# Patient Record
Sex: MALE | Race: WHITE | NOT HISPANIC OR LATINO | Employment: FULL TIME | URBAN - NONMETROPOLITAN AREA
[De-identification: names, ages, dates, MRNs, and addresses within clinical notes are randomized per-mention and may not be internally consistent; named-entity substitution may affect disease eponyms.]

---

## 2023-06-12 DIAGNOSIS — I10 HYPERTENSION, UNSPECIFIED TYPE: ICD-10-CM

## 2023-06-12 RX ORDER — HYDROCHLOROTHIAZIDE 12.5 MG/1
12.5 CAPSULE ORAL DAILY
Qty: 90 CAPSULE | Refills: 1 | Status: SHIPPED | OUTPATIENT
Start: 2023-06-12 | End: 2023-12-07

## 2023-06-12 RX ORDER — HYDROCHLOROTHIAZIDE 12.5 MG/1
1 CAPSULE ORAL DAILY
COMMUNITY
Start: 2021-07-12 | End: 2023-06-12 | Stop reason: SDUPTHER

## 2023-06-16 LAB
ALANINE AMINOTRANSFERASE (SGPT) (U/L) IN SER/PLAS: 27 U/L (ref 10–52)
ALBUMIN (G/DL) IN SER/PLAS: 5 G/DL (ref 3.4–5)
ALKALINE PHOSPHATASE (U/L) IN SER/PLAS: 67 U/L (ref 33–120)
ANION GAP IN SER/PLAS: 15 MMOL/L (ref 10–20)
ASPARTATE AMINOTRANSFERASE (SGOT) (U/L) IN SER/PLAS: 22 U/L (ref 9–39)
BILIRUBIN TOTAL (MG/DL) IN SER/PLAS: 1 MG/DL (ref 0–1.2)
CALCIUM (MG/DL) IN SER/PLAS: 10.3 MG/DL (ref 8.6–10.6)
CARBON DIOXIDE, TOTAL (MMOL/L) IN SER/PLAS: 25 MMOL/L (ref 21–32)
CHLORIDE (MMOL/L) IN SER/PLAS: 107 MMOL/L (ref 98–107)
CHOLESTEROL (MG/DL) IN SER/PLAS: 184 MG/DL (ref 0–199)
CHOLESTEROL IN HDL (MG/DL) IN SER/PLAS: 43.1 MG/DL
CHOLESTEROL/HDL RATIO: 4.3
CREATININE (MG/DL) IN SER/PLAS: 0.89 MG/DL (ref 0.5–1.3)
ERYTHROCYTE DISTRIBUTION WIDTH (RATIO) BY AUTOMATED COUNT: 13.2 % (ref 11.5–14.5)
ERYTHROCYTE MEAN CORPUSCULAR HEMOGLOBIN CONCENTRATION (G/DL) BY AUTOMATED: 32.4 G/DL (ref 32–36)
ERYTHROCYTE MEAN CORPUSCULAR VOLUME (FL) BY AUTOMATED COUNT: 89 FL (ref 80–100)
ERYTHROCYTES (10*6/UL) IN BLOOD BY AUTOMATED COUNT: 5.21 X10E12/L (ref 4.5–5.9)
GFR MALE: >90 ML/MIN/1.73M2
GLUCOSE (MG/DL) IN SER/PLAS: 96 MG/DL (ref 74–99)
HEMATOCRIT (%) IN BLOOD BY AUTOMATED COUNT: 46.3 % (ref 41–52)
HEMOGLOBIN (G/DL) IN BLOOD: 15 G/DL (ref 13.5–17.5)
LDL: 98 MG/DL (ref 0–99)
LEUKOCYTES (10*3/UL) IN BLOOD BY AUTOMATED COUNT: 6.6 X10E9/L (ref 4.4–11.3)
NON HDL CHOLESTEROL: 141 MG/DL
NRBC (PER 100 WBCS) BY AUTOMATED COUNT: 0 /100 WBC (ref 0–0)
PLATELETS (10*3/UL) IN BLOOD AUTOMATED COUNT: 226 X10E9/L (ref 150–450)
POTASSIUM (MMOL/L) IN SER/PLAS: 4.5 MMOL/L (ref 3.5–5.3)
PROSTATE SPECIFIC ANTIGEN,SCREEN: 0.36 NG/ML (ref 0–4)
PROTEIN TOTAL: 7.1 G/DL (ref 6.4–8.2)
SODIUM (MMOL/L) IN SER/PLAS: 142 MMOL/L (ref 136–145)
TRIGLYCERIDE (MG/DL) IN SER/PLAS: 214 MG/DL (ref 0–149)
UREA NITROGEN (MG/DL) IN SER/PLAS: 15 MG/DL (ref 6–23)
VLDL: 43 MG/DL (ref 0–40)

## 2023-06-21 DIAGNOSIS — E78.49 OTHER HYPERLIPIDEMIA: Primary | ICD-10-CM

## 2023-06-21 PROBLEM — E78.5 HYPERLIPIDEMIA: Status: ACTIVE | Noted: 2023-06-21

## 2023-06-21 RX ORDER — ROSUVASTATIN CALCIUM 10 MG/1
1 TABLET, COATED ORAL DAILY
COMMUNITY
Start: 2014-04-10 | End: 2023-06-21 | Stop reason: SDUPTHER

## 2023-06-21 RX ORDER — ROSUVASTATIN CALCIUM 10 MG/1
TABLET, COATED ORAL
Qty: 90 TABLET | Refills: 3 | Status: SHIPPED | OUTPATIENT
Start: 2023-06-21

## 2023-07-06 LAB — FECAL OCCULT BLD IMMUNOASSAY: NEGATIVE

## 2023-07-14 DIAGNOSIS — I10 HYPERTENSION, UNSPECIFIED TYPE: ICD-10-CM

## 2023-07-14 RX ORDER — LISINOPRIL 10 MG/1
1 TABLET ORAL DAILY
COMMUNITY
Start: 2022-05-09 | End: 2023-07-14 | Stop reason: SDUPTHER

## 2023-07-14 RX ORDER — LISINOPRIL 10 MG/1
TABLET ORAL
Qty: 90 TABLET | Refills: 3 | Status: SHIPPED | OUTPATIENT
Start: 2023-07-14 | End: 2023-07-17 | Stop reason: SINTOL

## 2023-07-17 ENCOUNTER — OFFICE VISIT (OUTPATIENT)
Dept: PRIMARY CARE | Facility: CLINIC | Age: 58
End: 2023-07-17
Payer: COMMERCIAL

## 2023-07-17 VITALS
OXYGEN SATURATION: 97 % | HEART RATE: 46 BPM | TEMPERATURE: 97 F | SYSTOLIC BLOOD PRESSURE: 136 MMHG | RESPIRATION RATE: 14 BRPM | BODY MASS INDEX: 25.34 KG/M2 | WEIGHT: 171.1 LBS | HEIGHT: 69 IN | DIASTOLIC BLOOD PRESSURE: 73 MMHG

## 2023-07-17 DIAGNOSIS — J30.9 ALLERGIC RHINITIS, UNSPECIFIED SEASONALITY, UNSPECIFIED TRIGGER: ICD-10-CM

## 2023-07-17 DIAGNOSIS — Z13.6 SCREENING FOR CARDIOVASCULAR CONDITION: ICD-10-CM

## 2023-07-17 DIAGNOSIS — E78.5 HYPERLIPIDEMIA, UNSPECIFIED HYPERLIPIDEMIA TYPE: ICD-10-CM

## 2023-07-17 DIAGNOSIS — E03.9 HYPOTHYROIDISM, UNSPECIFIED TYPE: ICD-10-CM

## 2023-07-17 DIAGNOSIS — E66.3 OVERWEIGHT WITH BODY MASS INDEX (BMI) OF 25 TO 25.9 IN ADULT: ICD-10-CM

## 2023-07-17 DIAGNOSIS — Z00.00 HEALTHCARE MAINTENANCE: Primary | ICD-10-CM

## 2023-07-17 DIAGNOSIS — J45.909 REACTIVE AIRWAY DISEASE WITHOUT COMPLICATION, UNSPECIFIED ASTHMA SEVERITY, UNSPECIFIED WHETHER PERSISTENT (HHS-HCC): ICD-10-CM

## 2023-07-17 DIAGNOSIS — R01.1 HEART MURMUR: ICD-10-CM

## 2023-07-17 DIAGNOSIS — I10 HYPERTENSION, UNSPECIFIED TYPE: ICD-10-CM

## 2023-07-17 PROBLEM — M25.561 RIGHT KNEE PAIN: Status: ACTIVE | Noted: 2023-07-17

## 2023-07-17 PROBLEM — N20.0 RECURRENT KIDNEY STONES: Status: ACTIVE | Noted: 2023-07-17

## 2023-07-17 PROBLEM — K21.9 GERD (GASTROESOPHAGEAL REFLUX DISEASE): Status: ACTIVE | Noted: 2023-07-17

## 2023-07-17 PROBLEM — H35.719 CENTRAL SEROUS RETINOPATHY: Status: ACTIVE | Noted: 2023-07-17

## 2023-07-17 PROBLEM — R00.1 BRADYCARDIA: Status: ACTIVE | Noted: 2023-07-17

## 2023-07-17 PROBLEM — G47.30 SLEEP APNEA: Status: ACTIVE | Noted: 2023-07-17

## 2023-07-17 PROBLEM — E23.0 HYPOPITUITARISM (MULTI): Status: ACTIVE | Noted: 2023-07-17

## 2023-07-17 PROCEDURE — 99396 PREV VISIT EST AGE 40-64: CPT | Performed by: FAMILY MEDICINE

## 2023-07-17 PROCEDURE — 3075F SYST BP GE 130 - 139MM HG: CPT | Performed by: FAMILY MEDICINE

## 2023-07-17 PROCEDURE — 3008F BODY MASS INDEX DOCD: CPT | Performed by: FAMILY MEDICINE

## 2023-07-17 PROCEDURE — 99214 OFFICE O/P EST MOD 30 MIN: CPT | Performed by: FAMILY MEDICINE

## 2023-07-17 PROCEDURE — 3077F SYST BP >= 140 MM HG: CPT | Performed by: FAMILY MEDICINE

## 2023-07-17 PROCEDURE — 1036F TOBACCO NON-USER: CPT | Performed by: FAMILY MEDICINE

## 2023-07-17 PROCEDURE — 3078F DIAST BP <80 MM HG: CPT | Performed by: FAMILY MEDICINE

## 2023-07-17 RX ORDER — MONTELUKAST SODIUM 10 MG/1
10 TABLET ORAL NIGHTLY
Qty: 30 TABLET | Refills: 11 | Status: SHIPPED | OUTPATIENT
Start: 2023-07-17 | End: 2023-08-08

## 2023-07-17 RX ORDER — LEVOTHYROXINE SODIUM 75 UG/1
1 TABLET ORAL DAILY
COMMUNITY
Start: 2013-02-08 | End: 2023-07-19

## 2023-07-17 RX ORDER — MULTIVITAMIN
1 TABLET ORAL DAILY
COMMUNITY

## 2023-07-17 RX ORDER — OLMESARTAN MEDOXOMIL 20 MG/1
10 TABLET ORAL DAILY
Qty: 15 TABLET | Refills: 1 | Status: SHIPPED | OUTPATIENT
Start: 2023-07-17 | End: 2023-08-08

## 2023-07-17 RX ORDER — ALBUTEROL SULFATE 90 UG/1
2 AEROSOL, METERED RESPIRATORY (INHALATION) EVERY 4 HOURS PRN
Qty: 8.5 G | Refills: 0 | Status: SHIPPED | OUTPATIENT
Start: 2023-07-17 | End: 2024-01-22 | Stop reason: SDUPTHER

## 2023-07-17 NOTE — ASSESSMENT & PLAN NOTE
Noted on prior exam, not present today, patient reassured.  Will continue to reassess on physical exams.

## 2023-07-17 NOTE — PROGRESS NOTES
Subjective   Patient ID: Damion Williamson is a 57 y.o. male who presents for Annual Exam (Pt is here for annual physical exam- ABN given to pt and signed, pt verbalized understanding.).    Well Adult Physical   Patient here for a comprehensive physical exam.      TDAP: 1/2021  SHINGRIX: completed  PNEUMOVAX: N/A  CSCOPE: 6/2023 FIT negative  PSA: 0.36 in 6/2023  AAA SCREEN: N/A (never smoker)  HEP C SCREEN: 1/2021 negative  CACS: 4.4 on 7/2016;   LIPID: 6/2023 TC/HDL ratio 4.3 (LDL 98 & TRIG 214).  Lipoprotein profile 9/2017 overall favorable with LDL particle size    States doing well overall.  Notes history of allergies, states worse this year than every.  Never smoker but states has lots of land and is clearing bushes often.  Some coughing on the Lisinopril.  Notes drainage which is abundant and new for him.  Drainage/congestion notably worse in AM.    Diet/Exercise: Balanced, exercises frequently.    Prostate cancer screening: Denies family history.   Denies hesitancy, nocturia, or urgency.     Colon cancer screening: Denies family history.   Denies melena, hematochezia, constipation, diarrhea, bloating, change in bowel habits.    CHRONIC CONDITIONS:  -Heart murmur  1-2/6 to RT upper sternal border noted on 5/2022 exam.  Patient asymptomatic from cardiac standpoint.  No FMHx of valvular issues.     -HTN  Taking HCTZ 12.5 mg daily, trial of Olmesartan to be started 7/2023  Lisinopril discontinued 7/2023 due to cough  Propranolol discontinued in the past due to bradycardia.  Suspect component of white coat hypertension, however, patient does not have validated home cuff.  7/2022 BP machine was NOT valid x2.     Medications are being taken and tolerated well. No side effects are reported.  Patient is NOT taking blood pressure outside of office.  Patient denies chest pain, shortness of breath with exertion, palpitations, lower extremity edema, headache, or dizziness.      -HLD/Mild CAD  Taking Rosuvastatin 10 mg  "daily.  Unable to tolerate Pravastatin due to side effects.  7/2016 CACS was 4.4.  9/2017 lipoprotein profile overall favorable with LDL particle size  6/2023 TC/HDL ratio 4.3 (LDL 98 & TRIG 214).    Medication(s) are being taken as directed and tolerated well.   No side effects reported.  Patient denies any facial droop, sudden vision loss, weakness or numbness on one side of body.   Patient denies chest pain, shortness of breath with exertion, palpitations, or symptoms of claudication.      -Hypothyroidism  CURRENT DOSE: Synthroid 75 mcg daily.  1/2023 TSH was normal.       Review of Systems   All other systems reviewed and are negative.      Objective   /71 (BP Location: Left arm, Patient Position: Sitting, BP Cuff Size: Small adult)   Pulse (!) 46   Temp 36.1 °C (97 °F) (Temporal)   Resp 14   Ht 1.759 m (5' 9.25\")   Wt 77.6 kg (171 lb 1.6 oz)   SpO2 97%   BMI 25.08 kg/m²     Physical Exam  Vitals and nursing note reviewed.   Constitutional:       General: He is not in acute distress.     Appearance: Normal appearance. He is not toxic-appearing.   HENT:      Head: Normocephalic and atraumatic.      Nose:      Right Turbinates: Swollen and pale.      Left Turbinates: Swollen and pale.      Comments: Clear post-nasal drainage noted  Eyes:      Extraocular Movements: Extraocular movements intact.      Pupils: Pupils are equal, round, and reactive to light.   Neck:      Thyroid: No thyromegaly.   Cardiovascular:      Rate and Rhythm: Regular rhythm.      Heart sounds: No murmur heard.     No friction rub. No gallop.      Comments: Borderline bradycardic  Pulmonary:      Effort: Pulmonary effort is normal.      Breath sounds: Wheezing (expiratory noted posteriorly) present. No rhonchi or rales.   Abdominal:      General: Bowel sounds are normal. There is no distension.      Palpations: Abdomen is soft. There is no mass.      Tenderness: There is no abdominal tenderness. There is no guarding. "   Musculoskeletal:      Right lower leg: No edema.      Left lower leg: No edema.   Lymphadenopathy:      Cervical: No cervical adenopathy.   Skin:     General: Skin is warm and dry.   Neurological:      General: No focal deficit present.      Mental Status: He is alert and oriented to person, place, and time.   Psychiatric:         Mood and Affect: Mood normal.         Behavior: Behavior normal.         Assessment/Plan   Problem List Items Addressed This Visit       Hypertension     BP is 164/71 in office today, this is elevated.  Possible hx of white coat hypertension, will recheck prior to discharge.  Due to report of cough will discontinue the Lisinopril and start on Olmesartan in place.  Trial of Olmesartan 20 mg sent to pharm (patient to start with 10 mg dose, to cut in half as 10 mg not available in EMR).  Continue HCTZ 12.5 mg daily  Patient asked to monitor BP a few times per week and keep log.    Patient to check BP regularly at home and keep a diary - DO THIS 1 HOUR AFTER TAKING MEDS.   This should be taken after sitting with feet flat on floor and resting for 5 minutes.   Arm should be level with your heart.   New guidelines recommend goal for blood pressure less than 130/80.   Ideally for stroke and heart attack risk reduction the systolic, or top, blood pressure number should be in the 110's or 120's.    PLEASE BRING BP CUFF IN TO NEXT VISIT FOR VALIDATION - TAKE YOUR BP @ HOME BEFORE YOU COME!            Relevant Medications    olmesartan (Benicar) 20 mg tablet    Hyperlipidemia     7/2016 CACS was 4.4.  9/2017 lipoprotein profile overall favorable with LDL particle size    6/2023 TC/HDL ratio 4.3 (LDL 98 & TRIG 214).  Goal TC/HDL ratio 3.4 or less, LDL 99 or less,  or less, and TRIG 150 or less.     Continue Rosuvastatin 10 mg daily.  Diet and exercise recommendations revisited.     I have ordered a coronary artery calcium score to better determine how to treat your elevated cholesterol. This  is a CT scan to determine if you have calcified plaque in your coronary arteries. Coronary artery calcium scores are used to help us determine how to best treat your elevated cholesterol.  The risks of this screen is we may find things that we are not looking for that we will have to follow up on such as lung nodules which are very common in [facility] fatty liver which is common in individuals that are overweight. There is also risk of radiation exposure. Follow up visit will be scheduled to review coronary artery calcium score, if needed. Patient to call or message for order if she wishes to pursue.          Heart murmur     Noted on prior exam, not present today, patient reassured.  Will continue to reassess on physical exams.         Hypothyroidism     1/2023 TSH normal, to be checked annually unless symptomatic, continue present regimen.  CURRENT DOSE: Synthroid 75 mcg daily.         Overweight with body mass index (BMI) of 25 to 25.9 in adult    Healthcare maintenance - Primary     Vaccines and screenings reviewed.  Questionnaires completed.  Health and wellness topics reviewed.  Diet and exercise recommendations revisited.  Routine blood work reviewed today.          Allergic rhinitis     + cough, + wheezing, + post-nasal drip  Discontinuing Lisinopril today incase contributing to cough.  CXR deferred as CACS was ordered and will provide a more in depth evaluation.    START trial of montelukast, which is generic Singulair.  You can take this daily or just daily during your season of flare for allergies/asthma.   Side effects of this medication can include vivid dreams and depression.   Should this occur, stop the medication and those side effects resolve.     An albuterol inhaler has been sent to your pharmacy.  You may use one to two puffs every four hours as needed for wheeze, cough, or chest tightness, or for use 15 minutes prior to activity if exercise is triggering your symptoms. Ask your pharmacist  for help with instructions for administration if you are not familiar with use of inhalers.     Continue with good hydration, at least 64 ounces of water per day.  Can start daily Mucinex if still have issues with secretions.          Other Visit Diagnoses       Screening for cardiovascular condition              Follow-up in 6 months for routine care.  Call for sooner follow-up if needed.     Time Spent  Prep time on day of patient encounter: 5 minutes  Time spent directly with patient, family or caregiver: 22 minutes  Additional Time Spent on Patient Care Activities: 5 minutes  Documentation Time: 8 minutes  Other Time Spent: 0 minutes  Total: 40 minutes        Scribe Attestation  By signing my name below, IZulema Scribe   attest that this documentation has been prepared under the direction and in the presence of Karlene Hicks DO.

## 2023-07-17 NOTE — ASSESSMENT & PLAN NOTE
BP is 164/71 in office today, this is elevated.  Possible hx of white coat hypertension, will recheck prior to discharge.  Due to report of cough will discontinue the Lisinopril and start on Olmesartan in place.  Trial of Olmesartan 20 mg sent to pharm (patient to start with 10 mg dose, to cut in half as 10 mg not available in EMR).  Continue HCTZ 12.5 mg daily  Patient asked to monitor BP a few times per week and keep log.    Patient to check BP regularly at home and keep a diary - DO THIS 1 HOUR AFTER TAKING MEDS.   This should be taken after sitting with feet flat on floor and resting for 5 minutes.   Arm should be level with your heart.   New guidelines recommend goal for blood pressure less than 130/80.   Ideally for stroke and heart attack risk reduction the systolic, or top, blood pressure number should be in the 110's or 120's.    PLEASE BRING BP CUFF IN TO NEXT VISIT FOR VALIDATION - TAKE YOUR BP @ HOME BEFORE YOU COME!

## 2023-07-17 NOTE — ASSESSMENT & PLAN NOTE
7/2016 CACS was 4.4.  9/2017 lipoprotein profile overall favorable with LDL particle size    6/2023 TC/HDL ratio 4.3 (LDL 98 & TRIG 214).  Goal TC/HDL ratio 3.4 or less, LDL 99 or less,  or less, and TRIG 150 or less.     Continue Rosuvastatin 10 mg daily.  Diet and exercise recommendations revisited.     I have ordered a coronary artery calcium score to better determine how to treat your elevated cholesterol. This is a CT scan to determine if you have calcified plaque in your coronary arteries. Coronary artery calcium scores are used to help us determine how to best treat your elevated cholesterol.  The risks of this screen is we may find things that we are not looking for that we will have to follow up on such as lung nodules which are very common in [facility] fatty liver which is common in individuals that are overweight. There is also risk of radiation exposure. Follow up visit will be scheduled to review coronary artery calcium score, if needed. Patient to call or message for order if she wishes to pursue.

## 2023-07-17 NOTE — ASSESSMENT & PLAN NOTE
Vaccines and screenings reviewed.  Questionnaires completed.  Health and wellness topics reviewed.  Diet and exercise recommendations revisited.  Routine blood work reviewed today.

## 2023-07-17 NOTE — ASSESSMENT & PLAN NOTE
+ cough, + wheezing, + post-nasal drip  Discontinuing Lisinopril today incase contributing to cough.  CXR deferred as CACS was ordered and will provide a more in depth evaluation.    START trial of montelukast, which is generic Singulair.  You can take this daily or just daily during your season of flare for allergies/asthma.   Side effects of this medication can include vivid dreams and depression.   Should this occur, stop the medication and those side effects resolve.     An albuterol inhaler has been sent to your pharmacy.  You may use one to two puffs every four hours as needed for wheeze, cough, or chest tightness, or for use 15 minutes prior to activity if exercise is triggering your symptoms. Ask your pharmacist for help with instructions for administration if you are not familiar with use of inhalers.     Continue with good hydration, at least 64 ounces of water per day.  Can start daily Mucinex if still have issues with secretions.

## 2023-07-19 DIAGNOSIS — E03.8 OTHER SPECIFIED HYPOTHYROIDISM: ICD-10-CM

## 2023-07-19 RX ORDER — LEVOTHYROXINE SODIUM 75 UG/1
TABLET ORAL
Qty: 90 TABLET | Refills: 3 | Status: SHIPPED | OUTPATIENT
Start: 2023-07-19

## 2023-08-08 DIAGNOSIS — I10 HYPERTENSION, UNSPECIFIED TYPE: ICD-10-CM

## 2023-08-08 DIAGNOSIS — J30.9 ALLERGIC RHINITIS, UNSPECIFIED SEASONALITY, UNSPECIFIED TRIGGER: ICD-10-CM

## 2023-08-08 RX ORDER — MONTELUKAST SODIUM 10 MG/1
10 TABLET ORAL NIGHTLY
Qty: 30 TABLET | Refills: 11 | Status: SHIPPED | OUTPATIENT
Start: 2023-08-08 | End: 2024-08-07

## 2023-08-08 RX ORDER — OLMESARTAN MEDOXOMIL 20 MG/1
10 TABLET ORAL DAILY
Qty: 15 TABLET | Refills: 1 | Status: SHIPPED | OUTPATIENT
Start: 2023-08-08 | End: 2023-09-06

## 2023-09-06 DIAGNOSIS — I10 HYPERTENSION, UNSPECIFIED TYPE: ICD-10-CM

## 2023-09-06 RX ORDER — OLMESARTAN MEDOXOMIL 20 MG/1
10 TABLET ORAL DAILY
Qty: 15 TABLET | Refills: 0 | Status: SHIPPED | OUTPATIENT
Start: 2023-09-06 | End: 2023-10-12 | Stop reason: SDUPTHER

## 2023-10-12 DIAGNOSIS — I10 HYPERTENSION, UNSPECIFIED TYPE: ICD-10-CM

## 2023-10-12 RX ORDER — OLMESARTAN MEDOXOMIL 20 MG/1
10 TABLET ORAL DAILY
Qty: 45 TABLET | Refills: 3 | Status: SHIPPED | OUTPATIENT
Start: 2023-10-12 | End: 2024-10-06

## 2023-12-07 DIAGNOSIS — I10 HYPERTENSION, UNSPECIFIED TYPE: ICD-10-CM

## 2023-12-07 RX ORDER — HYDROCHLOROTHIAZIDE 12.5 MG/1
12.5 CAPSULE ORAL DAILY
Qty: 90 CAPSULE | Refills: 3 | Status: SHIPPED | OUTPATIENT
Start: 2023-12-07

## 2024-01-18 NOTE — PROGRESS NOTES
"Subjective   Patient ID: Damion Williamson \"Sami\" is a 58 y.o. male who presents for Follow-up, Hypertension (Brought in BP cuff to measure accuracy. ), Hyperlipidemia, Hypothyroidism, and Sinus Problem (Issued since chayito time, states he has swollen lymph nodes under arms. Would like to discuss. ).  HPI  Patient of Dr. Garber here for 6 month follow up - last visit 7/2023 with [facility] for a physical    CHRONIC CONDITIONS:    -HTN  Taking HCTZ 12.5 mg daily, trial of Olmesartan to be started 7/2023  Lisinopril discontinued 7/2023 due to cough  Propranolol discontinued in the past due to bradycardia.  Suspect component of white coat hypertension, however, brought in home machine and just got it yesterday and was 135/80, 122/80 - through [employer]  Does not salt food, does love salty foods at restaurant, but overall does try to keep salt in diet reduced     Medications are being taken and tolerated well. No side effects are reported.  Patient has just started taking blood pressure outside of office.  Patient denies chest pain, shortness of breath with exertion, palpitations, lower extremity edema, headache, or dizziness.      -HLD/Mild CAD  Taking Rosuvastatin 10 mg daily.  Unable to tolerate Pravastatin due to side effects.  7/2016 CACS was 4.4.  9/2017 lipoprotein profile overall favorable with LDL particle size  6/2023 TC/HDL ratio 4.3 (LDL 98 & TRIG 214).     Medication(s) are being taken as directed and tolerated well.   No side effects reported.  Patient denies chest pain, shortness of breath with exertion, palpitations, or symptoms of claudication.      -Hypothyroidism  CURRENT DOSE: Synthroid 75 mcg daily.  1/2023 TSH was normal. -ordered today    Objective   Visit Vitals  /80   Pulse 56   Temp 36.6 °C (97.9 °F) (Temporal)   Resp 14   Ht 1.759 m (5' 9.25\")   Wt 81.2 kg (179 lb)   SpO2 98%   BMI 26.24 kg/m²   Smoking Status Never   BSA 1.99 m²      Physical Exam  Constitutional:       General: He is " not in acute distress.  HENT:      Head: Normocephalic and atraumatic.      Right Ear: Tympanic membrane, ear canal and external ear normal.      Left Ear: Tympanic membrane, ear canal and external ear normal.      Nose: Nose normal.      Mouth/Throat:      Mouth: Mucous membranes are moist.      Pharynx: No posterior oropharyngeal erythema.   Eyes:      General: No scleral icterus.     Extraocular Movements: Extraocular movements intact.      Pupils: Pupils are equal, round, and reactive to light.   Cardiovascular:      Rate and Rhythm: Normal rate and regular rhythm.      Pulses: Normal pulses.      Heart sounds: No murmur heard.  Pulmonary:      Effort: Pulmonary effort is normal. No respiratory distress.      Breath sounds: Normal breath sounds. No wheezing.   Abdominal:      General: Bowel sounds are normal. There is no distension.      Palpations: Abdomen is soft.      Tenderness: There is no abdominal tenderness.   Musculoskeletal:         General: Normal range of motion.      Cervical back: Neck supple. No rigidity.      Right lower leg: No edema.      Left lower leg: No edema.   Lymphadenopathy:      Cervical: No cervical adenopathy.   Skin:     General: Skin is warm and dry.      Findings: No rash.   Neurological:      General: No focal deficit present.      Mental Status: He is alert and oriented to person, place, and time.   Psychiatric:         Mood and Affect: Mood normal.         Thought Content: Thought content normal.         Assessment/Plan   Problem List Items Addressed This Visit       Hypertension - Primary    Hypothyroidism    Relevant Orders    Tsh With Reflex To Free T4 If Abnormal     Other Visit Diagnoses       Reactive airway disease without complication, unspecified asthma severity, unspecified whether persistent        Relevant Medications    albuterol (ProAir HFA) 90 mcg/actuation inhaler        No changes in medications at this time. TSH ordered today

## 2024-01-22 ENCOUNTER — APPOINTMENT (OUTPATIENT)
Dept: PRIMARY CARE | Facility: CLINIC | Age: 59
End: 2024-01-22
Payer: COMMERCIAL

## 2024-01-22 ENCOUNTER — OFFICE VISIT (OUTPATIENT)
Dept: PRIMARY CARE | Facility: CLINIC | Age: 59
End: 2024-01-22
Payer: COMMERCIAL

## 2024-01-22 ENCOUNTER — LAB (OUTPATIENT)
Dept: LAB | Facility: LAB | Age: 59
End: 2024-01-22
Payer: COMMERCIAL

## 2024-01-22 VITALS
HEART RATE: 56 BPM | WEIGHT: 179 LBS | TEMPERATURE: 97.9 F | BODY MASS INDEX: 26.51 KG/M2 | SYSTOLIC BLOOD PRESSURE: 122 MMHG | RESPIRATION RATE: 14 BRPM | DIASTOLIC BLOOD PRESSURE: 80 MMHG | HEIGHT: 69 IN | OXYGEN SATURATION: 98 %

## 2024-01-22 DIAGNOSIS — E03.9 HYPOTHYROIDISM, UNSPECIFIED TYPE: ICD-10-CM

## 2024-01-22 DIAGNOSIS — I10 HYPERTENSION, UNSPECIFIED TYPE: Primary | ICD-10-CM

## 2024-01-22 DIAGNOSIS — J45.909 REACTIVE AIRWAY DISEASE WITHOUT COMPLICATION, UNSPECIFIED ASTHMA SEVERITY, UNSPECIFIED WHETHER PERSISTENT (HHS-HCC): ICD-10-CM

## 2024-01-22 PROBLEM — E23.0 HYPOPITUITARISM (MULTI): Status: RESOLVED | Noted: 2023-07-17 | Resolved: 2024-01-22

## 2024-01-22 PROCEDURE — 99214 OFFICE O/P EST MOD 30 MIN: CPT | Performed by: FAMILY MEDICINE

## 2024-01-22 PROCEDURE — 1036F TOBACCO NON-USER: CPT | Performed by: FAMILY MEDICINE

## 2024-01-22 PROCEDURE — 84443 ASSAY THYROID STIM HORMONE: CPT

## 2024-01-22 PROCEDURE — 36415 COLL VENOUS BLD VENIPUNCTURE: CPT

## 2024-01-22 PROCEDURE — 3078F DIAST BP <80 MM HG: CPT | Performed by: FAMILY MEDICINE

## 2024-01-22 PROCEDURE — 3074F SYST BP LT 130 MM HG: CPT | Performed by: FAMILY MEDICINE

## 2024-01-22 PROCEDURE — 3008F BODY MASS INDEX DOCD: CPT | Performed by: FAMILY MEDICINE

## 2024-01-22 RX ORDER — ALBUTEROL SULFATE 90 UG/1
2 AEROSOL, METERED RESPIRATORY (INHALATION) EVERY 4 HOURS PRN
Qty: 8.5 G | Refills: 0 | Status: SHIPPED | OUTPATIENT
Start: 2024-01-22 | End: 2025-01-21

## 2024-01-22 RX ORDER — ASPIRIN 81 MG/1
81 TABLET ORAL DAILY
COMMUNITY

## 2024-01-22 ASSESSMENT — PATIENT HEALTH QUESTIONNAIRE - PHQ9
SUM OF ALL RESPONSES TO PHQ9 QUESTIONS 1 AND 2: 0
2. FEELING DOWN, DEPRESSED OR HOPELESS: NOT AT ALL
1. LITTLE INTEREST OR PLEASURE IN DOING THINGS: NOT AT ALL

## 2024-01-23 LAB — TSH SERPL-ACNC: 2.05 MIU/L (ref 0.44–3.98)

## 2024-06-17 DIAGNOSIS — E78.49 OTHER HYPERLIPIDEMIA: ICD-10-CM

## 2024-06-17 RX ORDER — ROSUVASTATIN CALCIUM 10 MG/1
TABLET, COATED ORAL
Qty: 90 TABLET | Refills: 0 | Status: SHIPPED | OUTPATIENT
Start: 2024-06-17

## 2024-07-15 DIAGNOSIS — E03.8 OTHER SPECIFIED HYPOTHYROIDISM: ICD-10-CM

## 2024-07-15 RX ORDER — LEVOTHYROXINE SODIUM 75 UG/1
75 TABLET ORAL DAILY
Qty: 90 TABLET | Refills: 3 | Status: SHIPPED | OUTPATIENT
Start: 2024-07-15

## 2024-07-29 ENCOUNTER — APPOINTMENT (OUTPATIENT)
Dept: PRIMARY CARE | Facility: CLINIC | Age: 59
End: 2024-07-29
Payer: COMMERCIAL

## 2024-07-29 ENCOUNTER — LAB (OUTPATIENT)
Dept: LAB | Facility: LAB | Age: 59
End: 2024-07-29
Payer: COMMERCIAL

## 2024-07-29 VITALS
HEIGHT: 69 IN | SYSTOLIC BLOOD PRESSURE: 127 MMHG | BODY MASS INDEX: 25.64 KG/M2 | OXYGEN SATURATION: 98 % | TEMPERATURE: 98.9 F | DIASTOLIC BLOOD PRESSURE: 75 MMHG | HEART RATE: 62 BPM | WEIGHT: 173.1 LBS

## 2024-07-29 DIAGNOSIS — E03.9 HYPOTHYROIDISM, UNSPECIFIED TYPE: ICD-10-CM

## 2024-07-29 DIAGNOSIS — R22.31 RIGHT AXILLARY FULLNESS: ICD-10-CM

## 2024-07-29 DIAGNOSIS — M72.2 PLANTAR FASCIITIS: ICD-10-CM

## 2024-07-29 DIAGNOSIS — Z12.5 SCREENING FOR PROSTATE CANCER: ICD-10-CM

## 2024-07-29 DIAGNOSIS — I10 HYPERTENSION, UNSPECIFIED TYPE: ICD-10-CM

## 2024-07-29 DIAGNOSIS — E66.3 OVERWEIGHT WITH BODY MASS INDEX (BMI) OF 25 TO 25.9 IN ADULT: ICD-10-CM

## 2024-07-29 DIAGNOSIS — Z12.11 COLON CANCER SCREENING: ICD-10-CM

## 2024-07-29 DIAGNOSIS — E78.5 HYPERLIPIDEMIA, UNSPECIFIED HYPERLIPIDEMIA TYPE: ICD-10-CM

## 2024-07-29 DIAGNOSIS — Z00.00 HEALTHCARE MAINTENANCE: Primary | ICD-10-CM

## 2024-07-29 DIAGNOSIS — Z00.00 HEALTHCARE MAINTENANCE: ICD-10-CM

## 2024-07-29 PROCEDURE — 80061 LIPID PANEL: CPT

## 2024-07-29 PROCEDURE — 84153 ASSAY OF PSA TOTAL: CPT

## 2024-07-29 PROCEDURE — 3078F DIAST BP <80 MM HG: CPT | Performed by: FAMILY MEDICINE

## 2024-07-29 PROCEDURE — 84443 ASSAY THYROID STIM HORMONE: CPT

## 2024-07-29 PROCEDURE — 99214 OFFICE O/P EST MOD 30 MIN: CPT | Performed by: FAMILY MEDICINE

## 2024-07-29 PROCEDURE — 3008F BODY MASS INDEX DOCD: CPT | Performed by: FAMILY MEDICINE

## 2024-07-29 PROCEDURE — 36415 COLL VENOUS BLD VENIPUNCTURE: CPT

## 2024-07-29 PROCEDURE — 85027 COMPLETE CBC AUTOMATED: CPT

## 2024-07-29 PROCEDURE — 1036F TOBACCO NON-USER: CPT | Performed by: FAMILY MEDICINE

## 2024-07-29 PROCEDURE — 3074F SYST BP LT 130 MM HG: CPT | Performed by: FAMILY MEDICINE

## 2024-07-29 PROCEDURE — 80053 COMPREHEN METABOLIC PANEL: CPT

## 2024-07-29 PROCEDURE — 99396 PREV VISIT EST AGE 40-64: CPT | Performed by: FAMILY MEDICINE

## 2024-07-29 RX ORDER — OLMESARTAN MEDOXOMIL 20 MG/1
10 TABLET ORAL DAILY
Qty: 45 TABLET | Refills: 3 | Status: SHIPPED | OUTPATIENT
Start: 2024-07-29 | End: 2025-07-24

## 2024-07-29 ASSESSMENT — PATIENT HEALTH QUESTIONNAIRE - PHQ9
2. FEELING DOWN, DEPRESSED OR HOPELESS: NOT AT ALL
1. LITTLE INTEREST OR PLEASURE IN DOING THINGS: NOT AT ALL
SUM OF ALL RESPONSES TO PHQ9 QUESTIONS 1 AND 2: 0

## 2024-07-29 NOTE — ASSESSMENT & PLAN NOTE
1/2024 TSH normal, to be checked annually unless symptomatic, continue present regimen.  CURRENT DOSE: Synthroid 75 mcg daily.    Repeat TFT ordered with routine labs today.

## 2024-07-29 NOTE — PROGRESS NOTES
Subjective   Patient ID: Sami Williamson is a 58 y.o. male who presents for Annual Exam (Wellness brochure provided to patient ), Hyperlipidemia, Hypertension (BP at home 127/75 this morning ), and Pain (Complains of pain in arches of feet bilaterally. Has been working from home so walks around with barefeet and slippers all day now. States pain is constant, no change in the morning.//Pain and swelling in right axilla intermittent - no swelling now  has not had sx for a few weeks ).    HPI     Patient presents today for 6 month follow-up + CPE.    Patient concerns:    #Annual exam  Wants to add on physical to only come once yearly  Needs labs    # BP numbers  Reports has health baldo through work for tracking BP - running in 110s at home    # Foot pain  Complains of pain in arches of feet bilaterally, but more so on right.  States new since working from home, now walks around with barefeet and slippers all day now.  He has no pain at rest, hurts when walking but seems to be better when up and about longer.    #R Axilla edema  Pain and swelling in right axilla intermittent   No swelling now  has not had sx for a few weeks  Splits wood, no known injury or trauma but possible may have pulled something  No night sweats, fevers, chills, or other B symptoms  No FMHx of leukemia or lymphoma     ROUTINE VISIT  CHRONIC CONDITIONS:     HTN  Suspect component of white coat hypertension, home numbers normotensive via third validated cuff.    Current regimen:  HCTZ 12.5 mg daily  Olmesartan 20 mg daily, started 7/2023    Prior medications:   Lisinopril discontinued 7/2023 due to cough  Propranolol discontinued in the past due to bradycardia.    Medications are being taken and tolerated well. No side effects are reported.  Patient is taking blood pressure outside of office and reports good control.  Patient denies chest pain, shortness of breath with exertion, palpitations, lower extremity edema, headache, or dizziness.  "    HLD  Taking Rosuvastatin 10 mg daily.  Unable to tolerate Pravastatin due to side effects.    7/2016 CACS was 4.37 (all in LCx)  9/2023 CACS was 63.18 (LCx 57.62, LAD 3.77, RCA 1.79, LM 0)    9/2017 lipoprotein profile overall favorable with LDL particle size  6/2023 TC/HDL ratio 4.3 (LDL 98 & TRIG 214).    Medication(s) are being taken as directed and tolerated well. No side effects reported.  Patient denies any facial droop, sudden vision loss, weakness or numbness on one side of body.   Patient denies chest pain, shortness of breath with exertion, palpitations, or symptoms of claudication.     Hypothyroidism  On Synthroid managed by PCP    1/2024 TSH normal, to be checked annually unless symptomatic, continue present regimen.  CURRENT DOSE: Synthroid 75 mcg daily.    Medication is being taken as directed and is well-tolerated.   Patient denies heat or cold intolerance, diarrhea or constipation, coarsening of hair, and palpitations.       Review of Systems   All other systems reviewed and are negative.      Objective   /75   Pulse 62   Temp 37.2 °C (98.9 °F)   Ht 1.753 m (5' 9\")   Wt 78.5 kg (173 lb 1.6 oz)   SpO2 98%   BMI 25.56 kg/m²     Physical Exam  Vitals and nursing note reviewed.   Constitutional:       General: He is not in acute distress.     Appearance: Normal appearance. He is not toxic-appearing.   HENT:      Head: Normocephalic and atraumatic.   Eyes:      Extraocular Movements: Extraocular movements intact.      Pupils: Pupils are equal, round, and reactive to light.   Neck:      Thyroid: No thyromegaly.      Vascular: No hepatojugular reflux or JVD.   Cardiovascular:      Rate and Rhythm: Normal rate and regular rhythm.      Heart sounds: No murmur heard.     No friction rub. No gallop.   Pulmonary:      Effort: Pulmonary effort is normal.      Breath sounds: Normal breath sounds. No wheezing, rhonchi or rales.   Abdominal:      General: Bowel sounds are normal. There is no " distension.      Palpations: Abdomen is soft. There is no mass.      Tenderness: There is no abdominal tenderness. There is no guarding.   Musculoskeletal:      Right lower leg: No edema.      Left lower leg: No edema.      Comments: 1 cm slight fullness right axilla, mildly tender to palpation.   Lymphadenopathy:      Cervical: No cervical adenopathy.   Skin:     General: Skin is warm and dry.   Neurological:      General: No focal deficit present.      Mental Status: He is alert and oriented to person, place, and time.      Gait: Gait normal.   Psychiatric:         Mood and Affect: Mood normal.         Behavior: Behavior normal.         Assessment/Plan   Problem List Items Addressed This Visit             ICD-10-CM    Hypertension I10     BP is 158/79 in office today, however, was 127/75 per home log w/ validated BP cuff.  Possible hx of white coat hypertension, reports normotensive pressures outside the office.  Patient has validated his home BP cuff, will treat based off home numbers.    Continue Olmesartan 20 mg daily  Continue HCTZ 12.5 mg daily  Patient asked to monitor BP a few times per week and keep log.    Patient to check BP regularly at home and keep a diary - DO THIS 1 HOUR AFTER TAKING MEDS.   This should be taken after sitting with feet flat on floor and resting for 5 minutes.   Arm should be level with your heart.   New guidelines recommend goal for blood pressure less than 130/80.   Ideally for stroke and heart attack risk reduction the systolic, or top, blood pressure number should be in the 110's or 120's.    PLEASE BRING BP CUFF IN TO NEXT VISIT FOR VALIDATION - TAKE YOUR BP @ HOME BEFORE YOU COME!          Relevant Medications    olmesartan (BENIcar) 20 mg tablet    Hyperlipidemia E78.5     7/2016 CACS was 4.37 (all in LCx)  9/2023 CACS was 63.18 (LCx 57.62, LAD 3.77, RCA 1.79, LM 0)     9/2017 lipoprotein profile overall favorable with LDL particle size  6/2023 TC/HDL ratio 4.3 (LDL 98 & TRIG  214).  Goal TC/HDL ratio 3.4 or less, LDL 99 or less,  or less, and TRIG 150 or less.     Continue Rosuvastatin 10 mg daily.  Diet and exercise recommendations revisited.   Will further address at follow-up with updated labs.         Hypothyroidism E03.9     1/2024 TSH normal, to be checked annually unless symptomatic, continue present regimen.  CURRENT DOSE: Synthroid 75 mcg daily.    Repeat TFT ordered with routine labs today.         Relevant Orders    TSH with reflex to Free T4 if abnormal    Overweight with body mass index (BMI) of 25 to 25.9 in adult E66.3, Z68.25    Healthcare maintenance - Primary Z00.00     Vaccines and screenings reviewed.  Questionnaires completed.  Health and wellness topics reviewed.  Diet and exercise recommendations revisited.  Routine blood work reviewed today.    VACCINES:  -TDAP good until 2031  -Shingrix previously completed, good for lifetime    SCREENINGS:  -Screening PSA normal in 6/2023, repeat due, ordered with labs  -Screening FIT test last completed 6/2022, repeat ordered today. They are aware that this requires consistent completion on annual basisfor maximum efficacy (10 years = same statistical outcomes as cscope)     LIFESTYLE MEASURES  -consider increasing protein intake provided no issues with kidneys to 1 gram per 1 pound of ideal body weight per not to exceed 150 gram per day. May have to supplement with a protein powder to achieve this goal.  -make sure you are avoiding refined carbs such as breads, pasta, cereal, candy, soda,  nutrition bars, granola, chips, and sugar sweetened beverages.      -eat 5- 7 servings daily of veggies,  healthy protein such as chicken, fish,  beans, and eggs, and include healthy fats in your diet such as seeds, nuts, olive oil, avocados, and salmon.   -exercise 4 - 6 days per week as you are able, 150 minutes total weekly divided up is recommended with 3-4 of those days including resistance/strength training.  -Vitamin D is  recommended at 1000 - 5000 IU international units daily.   -Always wear sunscreen when you have sun exposure.  -64 oz of water is recommended daily.  -Dental visits recommended every 6 months.  -Eye exam recommended every 2 years, for those with vision problems every year.           Relevant Orders    CBC    Comprehensive Metabolic Panel    Lipid Panel    Plantar fasciitis M72.2     Make sure to do stretching exercises, eccentric exercises demonstrated in office today.    Freeze a water bottle, and roll foot over the area    Can purchase a Plantar Fascitis Splint to be worn at night, helps to get foot stretched.     If symptoms not steadily improving, please call for referral to Physical Therapy, you do not need to come back in to the office for this referral.          Right axillary fullness R22.31     Ultrasound ordered today for possible lymphadenopathy  Routine labs including CBC ordered today.         Relevant Orders    US lymph nodes     Other Visit Diagnoses         Codes    Screening for prostate cancer     Z12.5    Relevant Orders    Prostate Specific Antigen, Screen    Colon cancer screening     Z12.11    Relevant Orders    Fecal Occult Blood Immunoassay            Follow-up in 1 year for routine care + CPE.  Call for sooner follow-up if needed.       Scribe Attestation  By signing my name below, IZulema Scribe   attest that this documentation has been prepared under the direction and in the presence of Karlene Hicks DO.

## 2024-07-29 NOTE — ASSESSMENT & PLAN NOTE
Make sure to do stretching exercises, eccentric exercises demonstrated in office today.    Freeze a water bottle, and roll foot over the area    Can purchase a Plantar Fascitis Splint to be worn at night, helps to get foot stretched.     If symptoms not steadily improving, please call for referral to Physical Therapy, you do not need to come back in to the office for this referral.

## 2024-07-29 NOTE — ASSESSMENT & PLAN NOTE
Vaccines and screenings reviewed.  Questionnaires completed.  Health and wellness topics reviewed.  Diet and exercise recommendations revisited.  Routine blood work reviewed today.    VACCINES:  -TDAP good until 2031  -Shingrix previously completed, good for lifetime    SCREENINGS:  -Screening PSA normal in 6/2023, repeat due, ordered with labs  -Screening FIT test last completed 6/2022, repeat ordered today. They are aware that this requires consistent completion on annual basisfor maximum efficacy (10 years = same statistical outcomes as cscope)     LIFESTYLE MEASURES  -consider increasing protein intake provided no issues with kidneys to 1 gram per 1 pound of ideal body weight per not to exceed 150 gram per day. May have to supplement with a protein powder to achieve this goal.  -make sure you are avoiding refined carbs such as breads, pasta, cereal, candy, soda,  nutrition bars, granola, chips, and sugar sweetened beverages.      -eat 5- 7 servings daily of veggies,  healthy protein such as chicken, fish,  beans, and eggs, and include healthy fats in your diet such as seeds, nuts, olive oil, avocados, and salmon.   -exercise 4 - 6 days per week as you are able, 150 minutes total weekly divided up is recommended with 3-4 of those days including resistance/strength training.  -Vitamin D is recommended at 1000 - 5000 IU international units daily.   -Always wear sunscreen when you have sun exposure.  -64 oz of water is recommended daily.  -Dental visits recommended every 6 months.  -Eye exam recommended every 2 years, for those with vision problems every year.

## 2024-07-29 NOTE — ASSESSMENT & PLAN NOTE
7/2016 CACS was 4.37 (all in LCx)  9/2023 CACS was 63.18 (LCx 57.62, LAD 3.77, RCA 1.79, LM 0)     9/2017 lipoprotein profile overall favorable with LDL particle size  6/2023 TC/HDL ratio 4.3 (LDL 98 & TRIG 214).  Goal TC/HDL ratio 3.4 or less, LDL 99 or less,  or less, and TRIG 150 or less.     Continue Rosuvastatin 10 mg daily.  Diet and exercise recommendations revisited.   Will further address at follow-up with updated labs.

## 2024-07-29 NOTE — ASSESSMENT & PLAN NOTE
BP is 158/79 in office today, however, was 127/75 per home log w/ validated BP cuff.  Possible hx of white coat hypertension, reports normotensive pressures outside the office.  Patient has validated his home BP cuff, will treat based off home numbers.    Continue Olmesartan 20 mg daily  Continue HCTZ 12.5 mg daily  Patient asked to monitor BP a few times per week and keep log.    Patient to check BP regularly at home and keep a diary - DO THIS 1 HOUR AFTER TAKING MEDS.   This should be taken after sitting with feet flat on floor and resting for 5 minutes.   Arm should be level with your heart.   New guidelines recommend goal for blood pressure less than 130/80.   Ideally for stroke and heart attack risk reduction the systolic, or top, blood pressure number should be in the 110's or 120's.    PLEASE BRING BP CUFF IN TO NEXT VISIT FOR VALIDATION - TAKE YOUR BP @ HOME BEFORE YOU COME!

## 2024-07-30 LAB
ALBUMIN SERPL BCP-MCNC: 5.3 G/DL (ref 3.4–5)
ALP SERPL-CCNC: 67 U/L (ref 33–120)
ALT SERPL W P-5'-P-CCNC: 30 U/L (ref 10–52)
ANION GAP SERPL CALC-SCNC: 13 MMOL/L (ref 10–20)
AST SERPL W P-5'-P-CCNC: 24 U/L (ref 9–39)
BILIRUB SERPL-MCNC: 1.3 MG/DL (ref 0–1.2)
BUN SERPL-MCNC: 20 MG/DL (ref 6–23)
CALCIUM SERPL-MCNC: 10.5 MG/DL (ref 8.6–10.6)
CHLORIDE SERPL-SCNC: 103 MMOL/L (ref 98–107)
CHOLEST SERPL-MCNC: 198 MG/DL (ref 0–199)
CHOLESTEROL/HDL RATIO: 4.2
CO2 SERPL-SCNC: 29 MMOL/L (ref 21–32)
CREAT SERPL-MCNC: 0.8 MG/DL (ref 0.5–1.3)
EGFRCR SERPLBLD CKD-EPI 2021: >90 ML/MIN/1.73M*2
ERYTHROCYTE [DISTWIDTH] IN BLOOD BY AUTOMATED COUNT: 13.2 % (ref 11.5–14.5)
GLUCOSE SERPL-MCNC: 104 MG/DL (ref 74–99)
HCT VFR BLD AUTO: 44.9 % (ref 41–52)
HDLC SERPL-MCNC: 46.9 MG/DL
HGB BLD-MCNC: 15.2 G/DL (ref 13.5–17.5)
LDLC SERPL CALC-MCNC: 92 MG/DL
MCH RBC QN AUTO: 29.2 PG (ref 26–34)
MCHC RBC AUTO-ENTMCNC: 33.9 G/DL (ref 32–36)
MCV RBC AUTO: 86 FL (ref 80–100)
NON HDL CHOLESTEROL: 151 MG/DL (ref 0–149)
NRBC BLD-RTO: 0 /100 WBCS (ref 0–0)
PLATELET # BLD AUTO: 216 X10*3/UL (ref 150–450)
POTASSIUM SERPL-SCNC: 4.5 MMOL/L (ref 3.5–5.3)
PROT SERPL-MCNC: 7.3 G/DL (ref 6.4–8.2)
PSA SERPL-MCNC: 0.41 NG/ML
RBC # BLD AUTO: 5.2 X10*6/UL (ref 4.5–5.9)
SODIUM SERPL-SCNC: 140 MMOL/L (ref 136–145)
TRIGL SERPL-MCNC: 295 MG/DL (ref 0–149)
TSH SERPL-ACNC: 3.13 MIU/L (ref 0.44–3.98)
VLDL: 59 MG/DL (ref 0–40)
WBC # BLD AUTO: 6 X10*3/UL (ref 4.4–11.3)

## 2024-08-05 ENCOUNTER — HOSPITAL ENCOUNTER (OUTPATIENT)
Dept: RADIOLOGY | Facility: CLINIC | Age: 59
Discharge: HOME | End: 2024-08-05
Payer: COMMERCIAL

## 2024-08-05 DIAGNOSIS — R22.31 RIGHT AXILLARY FULLNESS: ICD-10-CM

## 2024-08-05 PROCEDURE — 76882 US LMTD JT/FCL EVL NVASC XTR: CPT | Performed by: RADIOLOGY

## 2024-08-05 PROCEDURE — 76770 US EXAM ABDO BACK WALL COMP: CPT

## 2024-08-15 ENCOUNTER — OFFICE VISIT (OUTPATIENT)
Dept: PRIMARY CARE | Facility: CLINIC | Age: 59
End: 2024-08-15
Payer: COMMERCIAL

## 2024-08-15 VITALS
SYSTOLIC BLOOD PRESSURE: 163 MMHG | WEIGHT: 171.7 LBS | DIASTOLIC BLOOD PRESSURE: 85 MMHG | HEART RATE: 58 BPM | RESPIRATION RATE: 14 BRPM | BODY MASS INDEX: 25.36 KG/M2 | OXYGEN SATURATION: 98 % | TEMPERATURE: 98.1 F

## 2024-08-15 DIAGNOSIS — R22.30 AXILLARY FULLNESS: Primary | ICD-10-CM

## 2024-08-15 DIAGNOSIS — I10 HYPERTENSION, UNSPECIFIED TYPE: ICD-10-CM

## 2024-08-15 PROCEDURE — 3077F SYST BP >= 140 MM HG: CPT | Performed by: FAMILY MEDICINE

## 2024-08-15 PROCEDURE — 1036F TOBACCO NON-USER: CPT | Performed by: FAMILY MEDICINE

## 2024-08-15 PROCEDURE — 99213 OFFICE O/P EST LOW 20 MIN: CPT | Performed by: FAMILY MEDICINE

## 2024-08-15 PROCEDURE — 3079F DIAST BP 80-89 MM HG: CPT | Performed by: FAMILY MEDICINE

## 2024-08-15 NOTE — PROGRESS NOTES
" Subjective   Patient ID: Damion Williamson \"Sami\" is a 58 y.o. male who presents for Adenopathy (Saw [facility] 07/29/2024/US on 08/05/2024/Per SVN:/\"Lymph nodes appear mildly prominent, but do not have concerning features - (may be responding to inflammation, viral syndrome, infection, other)/If area of fullness persists for more than 4 weeks,  pls reach out to me and I will send you to general surgery for palpation, review, determination of next steps\"    /Labs 07/29/2024-All Normal).  HPI    C/o   Soreness right armpit, neck, forearm  ,  and soreness in left armpit -  new   Tingling in finger tips /  brief, over weekend .    Lightheadedness /  at rest, BP elevated 141/89 ; ears ringing;  no headache . Ears ringing now, and BP always high here     No fmhx of lymph node issues     Review of Systems  No night sweats /  no chills/  no measured fever. No ear pain, headache.  No sore throat .  No n/v/d/c .   No skin rash/ dysuria . No joint pain or swelling.  No cough /no chest pain / no sob.  Wt loss-  is trying to lose wt .  Appetite is fine, eating the same.     SOC  , works from home  Over weekend,  cutting bushes , so not sure if using tools caused the soreness     Objective   /85 (BP Location: Right arm, Patient Position: Sitting, BP Cuff Size: Adult)   Pulse 58   Temp 36.7 °C (98.1 °F)   Resp 14   Wt 77.9 kg (171 lb 11.2 oz)   SpO2 98%   BMI 25.36 kg/m²     Physical Exam  Vitals reviewed.   Constitutional:       General: He is not in acute distress.     Appearance: He is not ill-appearing or diaphoretic.   HENT:      Head: Normocephalic and atraumatic.   Eyes:      Extraocular Movements: Extraocular movements intact.      Conjunctiva/sclera: Conjunctivae normal.      Pupils: Pupils are equal, round, and reactive to light.   Cardiovascular:      Rate and Rhythm: Normal rate and regular rhythm.   Pulmonary:      Effort: Pulmonary effort is normal.      Breath sounds: Normal breath sounds. "   Musculoskeletal:         General: No swelling, tenderness or deformity. Normal range of motion.      Cervical back: Normal range of motion and neck supple.   Skin:     General: Skin is warm and dry.      Findings: No lesion or rash.   Neurological:      Mental Status: He is alert.   Psychiatric:         Mood and Affect: Mood normal.         Thought Content: Thought content normal.         Judgment: Judgment normal.       Lymph : no palpable Cervical LN ,  supraclav , or infraclav LN , bilateral   RIGHT AXILLA: palpable lymph node, tender with direct pressure. No redness/ fluctuance/ rash   LEFT AXILLA : normal no palpable Lymph notes   Assessment/Plan   Problem List Items Addressed This Visit          Medium    Hypertension     Other Visit Diagnoses       Axillary fullness    -  Primary    Relevant Orders    Referral to General Surgery        Forearm soreness- resolved  ; neck soreness and lump - resolved  . Axillary fullness and palpable lymph node.   Recommend c/s with Surgeon to have the right axilla examined and consider biopsy of the lump .     If BP remains elevated ,  call the office and talk to PCP about  it . She may want to make adjustments  to your medication regimen           CORIN Cole MD

## 2024-08-27 ENCOUNTER — APPOINTMENT (OUTPATIENT)
Dept: SURGERY | Facility: CLINIC | Age: 59
End: 2024-08-27
Payer: COMMERCIAL

## 2024-08-27 VITALS
OXYGEN SATURATION: 98 % | WEIGHT: 172 LBS | BODY MASS INDEX: 25.48 KG/M2 | TEMPERATURE: 98.1 F | RESPIRATION RATE: 17 BRPM | HEIGHT: 69 IN | DIASTOLIC BLOOD PRESSURE: 92 MMHG | SYSTOLIC BLOOD PRESSURE: 153 MMHG | HEART RATE: 58 BPM

## 2024-08-27 DIAGNOSIS — R59.0 AXILLARY LYMPHADENOPATHY: Primary | ICD-10-CM

## 2024-08-27 DIAGNOSIS — R22.30 AXILLARY FULLNESS: ICD-10-CM

## 2024-08-27 DIAGNOSIS — R59.0 INGUINAL LYMPHADENOPATHY: ICD-10-CM

## 2024-08-27 PROCEDURE — 3077F SYST BP >= 140 MM HG: CPT | Performed by: SURGERY

## 2024-08-27 PROCEDURE — 3080F DIAST BP >= 90 MM HG: CPT | Performed by: SURGERY

## 2024-08-27 PROCEDURE — 99204 OFFICE O/P NEW MOD 45 MIN: CPT | Performed by: SURGERY

## 2024-08-27 PROCEDURE — 3008F BODY MASS INDEX DOCD: CPT | Performed by: SURGERY

## 2024-08-27 PROCEDURE — 1036F TOBACCO NON-USER: CPT | Performed by: SURGERY

## 2024-08-27 NOTE — PROGRESS NOTES
"History Of Present Illness :  Damion Williamson \"Sami\" is a 58 y.o. male, patient of Dr. Karlene Hicks, who presents on referral from Dr. Deann Cole, for an axillary evaluation.    The patient was seen for routine office visit by Dr. Hicks on 7/29/2020 for and the patient noted right axillary swelling.  On examination, the following is noted: \"1 cm slight fullness right axilla, mildly tender to palpation.\"    This is followed by right axillary sonography on 8/5/2024.  I personally reviewed the report and the images.  The impression per report: \"Nonspecific mildly prominent right axillary lymph nodes are demonstrated. Individual nodes measure 1.5 x 0.4 x 0.7 cm, 2.4 x 0.8  x 0.8 cm, and 1.1 x 0.6 x 0.6 cm. Otherwise nonspecific soft tissues  are seen within the right axillary region. No fluid collection is  demonstrated.\"  On my review, these lymph nodes demonstrated normal architecture and hilar fat, with no cortical thickening.    The patient was subsequent seen by Dr. Cole, and referred to general surgery for further evaluation and treatment.    To me, the patient notes that in late June of this year, he felt an odd sensation in the right axilla.  On palpation, he felt a small mass.  Since then, he has noted  intermittent soreness in the right axilla, particularly with activity such as yard work.  However, over the last 10 days or so, he has noted that the soreness and the mass or lump have become less severe and less prominent.   The patient denies any trauma or skin lesions scratches etc. of the right upper extremity or chest prior to the appearance  small axillary mass.  The patient has never had any type of lymphadenopathy in the past.  He has had no major operations.     The patient notes that he saw me more than 5 years ago for a upper back skin lesion and this subsequently resolved.    Patient is .  Lives in [place].  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      Past Medical " History   Past Medical History:   Diagnosis Date    Encounter for screening for malignant neoplasm of prostate     Screening PSA (prostate specific antigen)    Localized swelling, mass and lump, unspecified 10/05/2017    Nodule, subcutaneous        Surgical History    Past Surgical History:   Procedure Laterality Date    TONSILLECTOMY  02/11/2014    Tonsillectomy With Adenoidectomy        Allergies   Allergies   Allergen Reactions    Pravastatin Other        Home Meds  Current Outpatient Medications   Medication Instructions    albuterol (ProAir HFA) 90 mcg/actuation inhaler 2 puffs, inhalation, Every 4 hours PRN    aspirin 81 mg, oral, Daily    hydroCHLOROthiazide (MICROZIDE) 12.5 mg, oral, Daily    levothyroxine (SYNTHROID, LEVOXYL) 75 mcg, oral, Daily, as directed    montelukast (SINGULAIR) 10 mg, oral, Nightly    multivitamin tablet 1 tablet, oral, Daily    olmesartan (BENICAR) 10 mg, oral, Daily    rosuvastatin (Crestor) 10 mg tablet TAKE 1 TABLET DAILY        Family History    Family History   Problem Relation Name Age of Onset    Blood clot Mother Sadia Williamson     Hyperlipidemia Mother Sadia Williamson     Diabetes Father Loki Williamson     Heart disease Father Loki Williamson     Hernia Father Loki Williamson     Hyperlipidemia Father Loki Williamson     Hypertension Father Loki Williamson         Social History  Social History     Tobacco Use    Smoking status: Never    Smokeless tobacco: Never   Substance Use Topics    Alcohol use: Yes     Alcohol/week: 2.0 standard drinks of alcohol     Types: 2 Glasses of wine per week    Drug use: Never        Review Of Systems    Review of Systems    General: Not Present- Obesity, Cancer, HIV, MRSA, Recent Cold/Flu, Tired During the Day and VRE.  HEENT: Not Present- Migraine, Cataracts, Glaucoma, Macular Degeneration and Retinal Detachment.  Respiratory:Not Present- Asthma, Chronic Cough, Difficulty Breathing on Exertion, Difficulty Breathing at Rest, Emphysema, Frequent Bronchitis,  Home CPAP/BiPAP, Home Oxygen, Pulmonary Embolus, Pneumonia/TB, Sleep Apnea and Snoring.  Cardiovascular: Not Present- Chest Pain, Congestive Heart Failure, Heart Attack,  Heart Stent, Internal Defibrillator, Irregular Heart Beat, Mitral Valve Prolapse, Murmur, Pacemaker and Peripheral Vascular Disease.  Gastrointestinal: Not Present- Heartburn, Hepatitis, Hiatal Hernia, Jaundice, Reflux, Stomach Ulcer and IBS.  Male Genitourinary: Not Present- Enlarged Prostate, Kidney Failure, Kidney Stones, Dialysis and Urinary Tract Infection.  Musculoskeletal: Not Present- Arthritis, Back Pain and Fibromyalgia.  Neurological: Not Present- Headaches, Numbness, Tingling, Seizures, Stroke,  Shunt and Weakness.  Psychiatric: Not Present- Anxiety, Bipolar, Depression and Panic Attacks.  Endocrine: Not Present- Diabetes, Hyperthyroidism and Low Blood Sugar.  Hematology: Not Present- Abnormal Bleeding, Anemia and Blood Clots.    Vitals  There were no vitals taken for this visit.     Physical Exam   General Complete Physical Exam    The physical exam findings are as follows:    General Appearance - Cooperative and Well groomed. Not in acute distress. Build & Nutrition - Well nourished.  Posture - Normal posture. Gait - Normal. Hydration - Well hydrated.    Integumentary  General Characteristics: Overall examination of the patient's skin reveals - no rashes, no suspicious lesions, no bruises and no evidence of scars. Color - normal coloration of skin. Skin Moisture - normal skin moisture. Temperature - normal  warmth is noted. Texture - normal skin texture.    Head and Neck  Head - normocephalic, atraumatic with no lesions or palpable masses.  Neck  Global Assessment - full range of motion. no bruit auscultated on the right, no bruit auscultated on the left, non-tender, no lymphadenopathy, no palpable mass on the right and no palpable mass on the left.  Trachea - midline.  Thyroid Gland Characteristics - no palpable nodules, normal  position, symmetric and smooth.    ENMT  Global Assessment  Upon examination of the ears, nose, mouth and throat - examination of the oral cavity reveals normal lips, teeth, gums and oral mucosa and hard and soft palates, tongue, tonsils and posterior pharynx are moist and symmetric without lesions.    Chest and Lung Exam  Inspection: Shape - Symmetric. Movements - Symmetrical. Accessory muscles - No use of accessory muscles in breathing.  Percussion:  Quality and Intensity: - Percussion normal.  Palpation: - Palpation normal.  Auscultation:  Breath sounds: - Normal and equal bilaterally.  Adventitious sounds: - none bilaterally.    Cardiovascular  Inspection: Carotid artery - Bilateral - Inspection Normal.  Palpation/Percussion: Examination by palpation and percussion reveals - No Thrills.  Cardiac Borders - Normal.  Auscultation: Rhythm - Regular. Rate: Regular.  Heart Sounds - Normal heart sounds, S1 WNL and S2 WNL.  Murmurs & Other Heart Sounds: Auscultation of the heart reveals - No Murmurs or other extra heart sounds.    Abdomen  Inspection: Inspection of the abdomen reveals - No Hernias.  Palpation/Percussion: Palpation and Percussion of the abdomen reveal - Non Tender and No Palpable abdominal  masses.  Liver: - Normal.  Spleen: - Normal.  Auscultation: Auscultation of the abdomen reveals - Bowel sounds normal.  Surgical scars: none    Rectal - Did not examine.    Lymphatic  Head & Neck  General Head & Neck Lymphatics:  Bilateral: Description - Normal.  Axillary  General Axillary Region:  Bilateral: Left is normal; on deep palpation along the inferior aspect of the right mid axilla, there is a 1-1.5 cm palpable mildly tender lymph node  Femoral & Inguinal  Generalized Femoral & Inguinal Lymphatics:  Bilateral: Description -left is normal; along the mid aspect of the right inguinal basin, there is a palpable ill-defined 1.0-1.5 cm nontender lymph node    Assessment/Plan   Mr. Williamson has right axillary  lymphadenopathy which is likely reactive.  This is a single lymph node enlargement clinically and sonographically.  In addition, the patient has a small asymptomatic nontender palpable lymph node in the mid right inguinal basin, which is again likely benign.    Recommendations:    Lymph node biopsy is not indicated or recommended.  The 2 lymph nodes are likely reactive based on history examination and imaging   studies.    Expectant management    No need for follow-up sonography at this point    The patient will see me in mid December for clinical follow-up and reexamination

## 2024-08-28 ENCOUNTER — LAB (OUTPATIENT)
Dept: LAB | Facility: LAB | Age: 59
End: 2024-08-28
Payer: COMMERCIAL

## 2024-08-28 DIAGNOSIS — Z12.11 COLON CANCER SCREENING: ICD-10-CM

## 2024-08-28 PROCEDURE — 82274 ASSAY TEST FOR BLOOD FECAL: CPT

## 2024-08-30 LAB — HEMOCCULT STL QL IA: NEGATIVE

## 2024-09-05 ENCOUNTER — PATIENT MESSAGE (OUTPATIENT)
Dept: PRIMARY CARE | Facility: CLINIC | Age: 59
End: 2024-09-05
Payer: COMMERCIAL

## 2024-09-05 DIAGNOSIS — R53.81 MALAISE AND FATIGUE: ICD-10-CM

## 2024-09-05 DIAGNOSIS — R53.83 MALAISE AND FATIGUE: ICD-10-CM

## 2024-09-05 DIAGNOSIS — R63.4 UNEXPLAINED WEIGHT LOSS: Primary | ICD-10-CM

## 2024-09-05 DIAGNOSIS — R59.1 LYMPHADENOPATHY: ICD-10-CM

## 2024-09-06 NOTE — PATIENT COMMUNICATION
Follow up labs ordered based on symptoms     Please have done at your convenience.   You don't need to fast.     There will be urine and blood orders.       I'll annotate on the labs after they all result with next step recommendations.     Happy belated anniversary.   :)

## 2024-09-13 DIAGNOSIS — E78.49 OTHER HYPERLIPIDEMIA: ICD-10-CM

## 2024-09-13 RX ORDER — ROSUVASTATIN CALCIUM 10 MG/1
TABLET, COATED ORAL
Qty: 90 TABLET | Refills: 3 | Status: SHIPPED | OUTPATIENT
Start: 2024-09-13

## 2024-10-04 ENCOUNTER — LAB (OUTPATIENT)
Dept: LAB | Facility: LAB | Age: 59
End: 2024-10-04
Payer: COMMERCIAL

## 2024-10-04 DIAGNOSIS — R53.81 MALAISE AND FATIGUE: ICD-10-CM

## 2024-10-04 DIAGNOSIS — R53.83 MALAISE AND FATIGUE: ICD-10-CM

## 2024-10-04 DIAGNOSIS — R59.1 LYMPHADENOPATHY: ICD-10-CM

## 2024-10-04 DIAGNOSIS — R63.4 UNEXPLAINED WEIGHT LOSS: ICD-10-CM

## 2024-10-04 PROCEDURE — 86038 ANTINUCLEAR ANTIBODIES: CPT

## 2024-10-04 PROCEDURE — 86431 RHEUMATOID FACTOR QUANT: CPT

## 2024-10-04 PROCEDURE — 86664 EPSTEIN-BARR NUCLEAR ANTIGEN: CPT

## 2024-10-04 PROCEDURE — 84155 ASSAY OF PROTEIN SERUM: CPT

## 2024-10-04 PROCEDURE — 86200 CCP ANTIBODY: CPT

## 2024-10-04 PROCEDURE — 85652 RBC SED RATE AUTOMATED: CPT

## 2024-10-04 PROCEDURE — 86140 C-REACTIVE PROTEIN: CPT

## 2024-10-04 PROCEDURE — 82728 ASSAY OF FERRITIN: CPT

## 2024-10-04 PROCEDURE — 86645 CMV ANTIBODY IGM: CPT

## 2024-10-04 PROCEDURE — 86663 EPSTEIN-BARR ANTIBODY: CPT

## 2024-10-04 PROCEDURE — 86618 LYME DISEASE ANTIBODY: CPT

## 2024-10-04 PROCEDURE — 84156 ASSAY OF PROTEIN URINE: CPT

## 2024-10-04 PROCEDURE — 86665 EPSTEIN-BARR CAPSID VCA: CPT

## 2024-10-04 PROCEDURE — 36415 COLL VENOUS BLD VENIPUNCTURE: CPT

## 2024-10-04 PROCEDURE — 85025 COMPLETE CBC W/AUTO DIFF WBC: CPT

## 2024-10-04 PROCEDURE — 84165 PROTEIN E-PHORESIS SERUM: CPT

## 2024-10-04 PROCEDURE — 86335 IMMUNFIX E-PHORSIS/URINE/CSF: CPT

## 2024-10-04 PROCEDURE — 86617 LYME DISEASE ANTIBODY: CPT

## 2024-10-04 PROCEDURE — 82550 ASSAY OF CK (CPK): CPT

## 2024-10-04 PROCEDURE — 84166 PROTEIN E-PHORESIS/URINE/CSF: CPT

## 2024-10-05 LAB
BASOPHILS # BLD AUTO: 0.05 X10*3/UL (ref 0–0.1)
BASOPHILS NFR BLD AUTO: 0.9 %
CCP IGG SERPL-ACNC: <1 U/ML
CK SERPL-CCNC: 152 U/L (ref 0–325)
CRP SERPL-MCNC: 1.06 MG/DL
EBV EA IGG SER QL: NEGATIVE
EBV NA AB SER QL: POSITIVE
EBV VCA IGG SER IA-ACNC: POSITIVE
EBV VCA IGM SER IA-ACNC: POSITIVE
EOSINOPHIL # BLD AUTO: 0.08 X10*3/UL (ref 0–0.7)
EOSINOPHIL NFR BLD AUTO: 1.4 %
ERYTHROCYTE [DISTWIDTH] IN BLOOD BY AUTOMATED COUNT: 13 % (ref 11.5–14.5)
ERYTHROCYTE [SEDIMENTATION RATE] IN BLOOD BY WESTERGREN METHOD: 12 MM/H (ref 0–20)
FERRITIN SERPL-MCNC: 626 NG/ML (ref 20–300)
HCT VFR BLD AUTO: 41.9 % (ref 41–52)
HGB BLD-MCNC: 14.6 G/DL (ref 13.5–17.5)
IMM GRANULOCYTES # BLD AUTO: 0.02 X10*3/UL (ref 0–0.7)
IMM GRANULOCYTES NFR BLD AUTO: 0.3 % (ref 0–0.9)
LYMPHOCYTES # BLD AUTO: 2.07 X10*3/UL (ref 1.2–4.8)
LYMPHOCYTES NFR BLD AUTO: 35.9 %
MCH RBC QN AUTO: 28.5 PG (ref 26–34)
MCHC RBC AUTO-ENTMCNC: 34.8 G/DL (ref 32–36)
MCV RBC AUTO: 82 FL (ref 80–100)
MONOCYTES # BLD AUTO: 1.18 X10*3/UL (ref 0.1–1)
MONOCYTES NFR BLD AUTO: 20.5 %
NEUTROPHILS # BLD AUTO: 2.36 X10*3/UL (ref 1.2–7.7)
NEUTROPHILS NFR BLD AUTO: 41 %
NRBC BLD-RTO: 0 /100 WBCS (ref 0–0)
PLATELET # BLD AUTO: 197 X10*3/UL (ref 150–450)
PROT SERPL-MCNC: 7.3 G/DL (ref 6.4–8.2)
PROT UR-ACNC: 6 MG/DL (ref 5–25)
RBC # BLD AUTO: 5.12 X10*6/UL (ref 4.5–5.9)
RHEUMATOID FACT SER NEPH-ACNC: <10 IU/ML (ref 0–15)
WBC # BLD AUTO: 5.8 X10*3/UL (ref 4.4–11.3)

## 2024-10-07 LAB
ALBUMIN: 4.8 G/DL (ref 3.4–5)
ALPHA 1 GLOBULIN: 0.3 G/DL (ref 0.2–0.6)
ALPHA 2 GLOBULIN: 0.5 G/DL (ref 0.4–1.1)
ANA SER QL HEP2 SUBST: NEGATIVE
BETA GLOBULIN: 0.9 G/DL (ref 0.5–1.2)
CMV IGM SERPL-ACNC: <8 AU/ML
GAMMA GLOBULIN: 0.8 G/DL (ref 0.5–1.4)
PATH REVIEW-SERUM PROTEIN ELECTROPHORESIS: NORMAL
PROTEIN ELECTROPHORESIS COMMENT: NORMAL

## 2024-10-09 LAB
ALBUMIN MFR UR ELPH: 31 %
ALPHA1 GLOB MFR UR ELPH: 16.3 %
ALPHA2 GLOB MFR UR ELPH: 18.8 %
B BURGDOR IGG SERPL QL IA: 0.19 IV
B BURGDOR IGG+IGM SER IA-IMP: NEGATIVE
B BURGDOR IGM SERPL QL IA: 0.24 IV
B BURGDOR.VLSE1+PEPC10 AB SER IA-ACNC: 0.96 IV
B-GLOBULIN MFR UR ELPH: 20.8 %
GAMMA GLOB MFR UR ELPH: 13.1 %
IMMUNOFIXATION COMMENT: NORMAL
PATH REVIEW - URINE IMMUNOFIXATION: NORMAL
PATH REVIEW-URINE PROTEIN ELECTROPHORESIS: NORMAL
URINE ELECTROPHORESIS COMMENT: NORMAL

## 2024-10-10 DIAGNOSIS — R53.83 MALAISE AND FATIGUE: ICD-10-CM

## 2024-10-10 DIAGNOSIS — R89.9 ABNORMAL LABORATORY TEST: ICD-10-CM

## 2024-10-10 DIAGNOSIS — R53.81 MALAISE AND FATIGUE: ICD-10-CM

## 2024-10-10 DIAGNOSIS — R59.1 LYMPHADENOPATHY: Primary | ICD-10-CM

## 2024-10-11 ENCOUNTER — TELEPHONE (OUTPATIENT)
Dept: PRIMARY CARE | Facility: CLINIC | Age: 59
End: 2024-10-11
Payer: COMMERCIAL

## 2024-10-11 DIAGNOSIS — R53.83 MALAISE AND FATIGUE: ICD-10-CM

## 2024-10-11 DIAGNOSIS — R53.81 MALAISE AND FATIGUE: ICD-10-CM

## 2024-10-11 DIAGNOSIS — R89.9 ABNORMAL LABORATORY TEST: Primary | ICD-10-CM

## 2024-10-11 DIAGNOSIS — R59.1 LYMPHADENOPATHY: ICD-10-CM

## 2024-10-11 NOTE — TELEPHONE ENCOUNTER
repeat testing for lyme dz in 2 weeks ordered -  if that is positive will refer to ID and that may explain the nodes and symptoms       If negative,  Would pt be willing to go back to dr burdick  (surgeon) since this is recurrent and there are unexplained symptoms of fatigue?  Or I can send him to another surgeon if he prefers and we can try to get a sample of one of the nodes       Can think about surgeon and can let me know when his repeat lab results come back

## 2024-10-11 NOTE — TELEPHONE ENCOUNTER
----- Message from Kimberly STEIN sent at 10/11/2024  1:29 PM EDT -----  Called pt gave results also told him to redo the blood tests in two week.  ( I did not see lab orders in system.  Patient is also wondering what to do for his fatigue and lymph nod in groin pain when it comes up. He says that it is not all the time but will be enough to get his attention?

## 2024-10-12 NOTE — TELEPHONE ENCOUNTER
Advised pt to repeat lab work by 10/25/24. Once we receive the results we will either refer to ID or surgeon. Pt had no further questions/concerns.

## 2024-10-22 DIAGNOSIS — J30.9 ALLERGIC RHINITIS, UNSPECIFIED SEASONALITY, UNSPECIFIED TRIGGER: ICD-10-CM

## 2024-10-22 RX ORDER — MONTELUKAST SODIUM 10 MG/1
10 TABLET ORAL NIGHTLY
Qty: 90 TABLET | Refills: 3 | Status: SHIPPED | OUTPATIENT
Start: 2024-10-22 | End: 2025-10-22

## 2024-10-25 ENCOUNTER — LAB (OUTPATIENT)
Dept: LAB | Facility: LAB | Age: 59
End: 2024-10-25
Payer: COMMERCIAL

## 2024-10-25 DIAGNOSIS — R53.81 MALAISE AND FATIGUE: ICD-10-CM

## 2024-10-25 DIAGNOSIS — R53.83 MALAISE AND FATIGUE: ICD-10-CM

## 2024-10-25 DIAGNOSIS — R89.9 ABNORMAL LABORATORY TEST: ICD-10-CM

## 2024-10-25 DIAGNOSIS — R59.1 LYMPHADENOPATHY: ICD-10-CM

## 2024-10-25 PROCEDURE — 36415 COLL VENOUS BLD VENIPUNCTURE: CPT

## 2024-10-25 PROCEDURE — 86664 EPSTEIN-BARR NUCLEAR ANTIGEN: CPT

## 2024-10-25 PROCEDURE — 86618 LYME DISEASE ANTIBODY: CPT

## 2024-10-25 PROCEDURE — 86663 EPSTEIN-BARR ANTIBODY: CPT

## 2024-10-25 PROCEDURE — 86665 EPSTEIN-BARR CAPSID VCA: CPT

## 2024-10-26 LAB
EBV EA IGG SER QL: NEGATIVE
EBV NA AB SER QL: POSITIVE
EBV VCA IGG SER IA-ACNC: POSITIVE
EBV VCA IGM SER IA-ACNC: POSITIVE

## 2024-10-29 LAB — B BURGDOR.VLSE1+PEPC10 AB SER IA-ACNC: 0.83 IV

## 2024-10-31 ENCOUNTER — TELEPHONE (OUTPATIENT)
Dept: PRIMARY CARE | Facility: CLINIC | Age: 59
End: 2024-10-31

## 2024-10-31 NOTE — TELEPHONE ENCOUNTER
Pt calling has questions about dx genny mcleod and how contagious is he? He is concerned about his wife.   She does not have any symptoms at this time. 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   Is there any vitiams he can take for energy to get him through to apt with inf disease 12/27/24?

## 2024-10-31 NOTE — TELEPHONE ENCOUNTER
Antibiodies showing for him are mostly  long term antibodies (showing long term immunity, the ones that develop when infection is at the end or even in the past (remote)     It is only because there are still some acute antibodies that even suggests it may be at the end of a recent infection  (the IgG antibodies are negative if it is a new/ very early infection)     So - might have been a month or more that the actual infection started      No need to avoid contact with people, would just refrain from same silverware/drinks, etc     Your wife has been exposed this whole time, and the vast majority of people have already had mono by their 50's,   so no change in behavior needed there     Do not need to isolate       Would take a multi vitamin    stay hydrated     No other specific rec's,   await ID appt and it's just tincture of time

## 2024-11-06 ENCOUNTER — OFFICE VISIT (OUTPATIENT)
Dept: PRIMARY CARE | Facility: CLINIC | Age: 59
End: 2024-11-06
Payer: COMMERCIAL

## 2024-11-06 VITALS
TEMPERATURE: 98.8 F | OXYGEN SATURATION: 96 % | WEIGHT: 169.1 LBS | BODY MASS INDEX: 24.97 KG/M2 | SYSTOLIC BLOOD PRESSURE: 164 MMHG | HEART RATE: 70 BPM | DIASTOLIC BLOOD PRESSURE: 76 MMHG | RESPIRATION RATE: 12 BRPM

## 2024-11-06 DIAGNOSIS — B27.00 GAMMAHERPESVIRAL MONONUCLEOSIS WITHOUT COMPLICATION: ICD-10-CM

## 2024-11-06 DIAGNOSIS — R22.31 RIGHT AXILLARY FULLNESS: Primary | ICD-10-CM

## 2024-11-06 NOTE — PROGRESS NOTES
FAMILY MEDICINE  OFFICE VISIT   Damion Williamson  [id number]  1965    PCP: Karlene Hicks DO     Chief Complaint:   Chief Complaint   Patient presents with    Pain     Right axilla x approx 4 days, radiating to clavicle   Of note-Pt observed ingrown hair this morning  07/29-First reported axilla pain/swollen lymph nodes  08/05-confirmed proud axillary lymph nodes  08/27-general surgeon-declined biopsy, follow up 12/17 09/13-blood work-[address] (+)  12/17-follow up with general surgeon  12/27-appt with infectious disease    Pt requesting testing for leukemia and lymphoma, if appropriate     SUBJECTIVE     Damion Williamson is a 58 y.o. English-speaking male with pertinent PMHx of recent EBV infection, who presents to the clinic with complaints of shoulder pain and right axillary tenderness.     Right shoulder pain  Right axillary tenderness  Patient has had the above history of axillary pain and swollen lymph nodes since July.  He has been worked up with his PCP.  He has had 2 EBV test that have been positive and likely has had a recent monoinfection that he has been suffering through.  Today he presents as he was having intermittent anterior shoulder pain after doing some yard work outside and some increasing right axillary tenderness over the site of his swollen lymph node.  This morning when he was in the shower before coming in he noticed that he had an infected hair follicle over top of the swollen lymph node.    He has had just seen general surgery for this and the surgeon recommended to watch and wait as resection of this lymph node may cause more damage given the vasculature and nerve bundles that are in the area.  Patient does have a follow-up appointment with general surgery in December as well as an infectious disease appointment in December.    He had concerns about leukemia or.  Lymphoma, we reviewed his lab work thus far today, including his recent CBC, ESR, CRP, and UPEP.  I explained  based on those labs I do not see any concerning at this time for leukemia or lymphoma. He denies night sweats, red or painful enlarged lymph nodes, or joint pain. He does report intermittent lymphadenopathy in other locations: left neck and groin.       The following portions of the patient's chart were reviewed in this encounter and updated as appropriate:  Tobacco  Allergies  Meds  Problems  Med Hx  Surg Hx  Fam Hx     .     Home Medication List:  Current Outpatient Medications   Medication Instructions    albuterol (ProAir HFA) 90 mcg/actuation inhaler 2 puffs, inhalation, Every 4 hours PRN    ascorbic acid (VITAMIN C ORAL) Take by mouth.    aspirin 81 mg, Daily    cholecalciferol, vitamin D3, (VITAMIN D3 ORAL) Take by mouth.    docosahexaenoic acid/epa (FISH OIL ORAL) Take by mouth.    hydroCHLOROthiazide (MICROZIDE) 12.5 mg, oral, Daily    levothyroxine (SYNTHROID, LEVOXYL) 75 mcg, oral, Daily, as directed    montelukast (SINGULAIR) 10 mg, oral, Nightly    multivitamin tablet 1 tablet, Daily    olmesartan (BENICAR) 10 mg, oral, Daily    rosuvastatin (Crestor) 10 mg tablet TAKE 1 TABLET DAILY         OBJECTIVE   /76 (BP Location: Left arm, Patient Position: Sitting, BP Cuff Size: Adult)   Pulse 70   Temp 37.1 °C (98.8 °F) (Temporal)   Resp 12   Wt 76.7 kg (169 lb 1.6 oz)   SpO2 96%   BMI 24.97 kg/m²   Vital signs and pulse oximetry reviewed.     Physical Exam  Vitals and nursing note reviewed.   Constitutional:       Appearance: Normal appearance.   HENT:      Head: Normocephalic and atraumatic.      Right Ear: External ear normal.      Left Ear: External ear normal.      Nose: Nose normal. No congestion or rhinorrhea.   Eyes:      General: No scleral icterus.     Conjunctiva/sclera: Conjunctivae normal.   Cardiovascular:      Rate and Rhythm: Normal rate and regular rhythm.      Heart sounds: No murmur heard.  Pulmonary:      Effort: Pulmonary effort is normal. No respiratory distress.       Breath sounds: Normal breath sounds. No wheezing, rhonchi or rales.   Abdominal:      General: Abdomen is flat. Bowel sounds are normal. There is no distension.      Tenderness: There is no abdominal tenderness. There is no guarding.      Hernia: No hernia is present.   Musculoskeletal:      Right shoulder: Tenderness (over area indicated in picture) present. No swelling or deformity.        Arms:       Right lower leg: No edema.      Left lower leg: No edema.      Comments: Right Axilla with ~1.5cm enlarged lymph node. Posterior and inferior quadrant of lymph node has overlying pimple with associated hair follicle. Tender over pimple and around the associated hair follicle. No concerns for cellulitis or abscess.    Skin:     General: Skin is warm.      Coloration: Skin is not jaundiced.   Neurological:      Mental Status: He is alert. Mental status is at baseline.   Psychiatric:         Mood and Affect: Mood normal.         Behavior: Behavior normal.         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 Discussed supportive care and expectations with lymphadenopathy.   - Continue with GenSx and ID follow-up in December.   - I independently reviewed his labwork from October and his notes from General Surgery.          Relevant Orders    Follow Up In [facility] - PCP - Established       Symptoms and Signs    Right axillary fullness - Primary    Current Assessment & Plan     Patient presents today with persistently enlarged LN in right axilla. He now has overlying pimple with associated hair follicle and intermittent shoulder pain.   - We discussed the anatomy of the axilla and shoulder and large vasculature and nerves in the region.   - Discussed the recent irritation in the axilla is likely due  to the inflammation associated with the hair follicle and pimple.   - He has had intermittent shoulder pain since this appeared, we discussed anatomy of the shoulder. Likely having shoulder pain d/t this local inflammation as well as recent outdoor work.   - Recommend warm compress in the right axilla, do not pick at the pimple. Allow it to resolve on its own with warm compresses.   - Advised icing shoulder once he is done with yardwork. Continue use of PRN Tylenol for pain control.   - Expect that pain in shoulder will resolve with improvement of this local inflammation of the axilla.   - Continue with General Surgery follow-up.   - Continue with ID follow-up.   - No concerning findings at this time and no B symptoms that are concerning for leukemia, lymphoma, or other malignancy.          Relevant Orders    Follow Up In [facility] - PCP - Established       Follow-Up Recommendations: No follow-ups on file.    Please excuse any typos or grammatical errors, part of this note was constructed with Dragon dictation software.    Carmen Godinez DO, MSEd  [facility]   Office: [phone]  11/8/2024 8:57 AM

## 2024-11-08 PROBLEM — B27.00 GAMMAHERPESVIRAL MONONUCLEOSIS WITHOUT COMPLICATION: Status: ACTIVE | Noted: 2024-11-08

## 2024-11-08 NOTE — ASSESSMENT & PLAN NOTE
Patient presents today with persistently enlarged LN in right axilla. He now has overlying pimple with associated hair follicle and intermittent shoulder pain.   - We discussed the anatomy of the axilla and shoulder and large vasculature and nerves in the region.   - Discussed the recent irritation in the axilla is likely due to the inflammation associated with the hair follicle and pimple.   - He has had intermittent shoulder pain since this appeared, we discussed anatomy of the shoulder. Likely having shoulder pain d/t this local inflammation as well as recent outdoor work.   - Recommend warm compress in the right axilla, do not pick at the pimple. Allow it to resolve on its own with warm compresses.   - Advised icing shoulder once he is done with yardwork. Continue use of PRN Tylenol for pain control.   - Expect that pain in shoulder will resolve with improvement of this local inflammation of the axilla.   - Continue with General Surgery follow-up.   - Continue with ID follow-up.   - No concerning findings at this time and no B symptoms that are concerning for leukemia, lymphoma, or other malignancy.

## 2024-11-08 NOTE — ASSESSMENT & PLAN NOTE
Patient with recent Mono infection, which I believe he is still recovering from. Discussed supportive care and expectations with lymphadenopathy.   - Continue with GenSx and ID follow-up in December.   - I independently reviewed his labwork from October and his notes from General Surgery.

## 2024-11-27 ENCOUNTER — OFFICE VISIT (OUTPATIENT)
Dept: PRIMARY CARE | Facility: CLINIC | Age: 59
End: 2024-11-27
Payer: COMMERCIAL

## 2024-11-27 VITALS
BODY MASS INDEX: 24.48 KG/M2 | WEIGHT: 165.8 LBS | HEART RATE: 61 BPM | DIASTOLIC BLOOD PRESSURE: 73 MMHG | OXYGEN SATURATION: 98 % | SYSTOLIC BLOOD PRESSURE: 126 MMHG | TEMPERATURE: 97.4 F

## 2024-11-27 DIAGNOSIS — M54.2 NECK PAIN: ICD-10-CM

## 2024-11-27 DIAGNOSIS — M25.511 RIGHT SHOULDER PAIN, UNSPECIFIED CHRONICITY: Primary | ICD-10-CM

## 2024-11-27 DIAGNOSIS — R79.89 LOW TESTOSTERONE: ICD-10-CM

## 2024-11-27 DIAGNOSIS — R79.89 ELEVATED FERRITIN: ICD-10-CM

## 2024-11-27 DIAGNOSIS — R53.83 OTHER FATIGUE: ICD-10-CM

## 2024-11-27 PROCEDURE — 99214 OFFICE O/P EST MOD 30 MIN: CPT | Performed by: FAMILY MEDICINE

## 2024-11-27 PROCEDURE — 3078F DIAST BP <80 MM HG: CPT | Performed by: FAMILY MEDICINE

## 2024-11-27 PROCEDURE — 3074F SYST BP LT 130 MM HG: CPT | Performed by: FAMILY MEDICINE

## 2024-11-27 NOTE — PROGRESS NOTES
Subjective   Patient ID: Sami Williamson is a 59 y.o. male who presents for Follow-up (Mychart msg EBV and discuss weight loss situation and some right lower ribcage pain which he had a CT scan and came back negative was told by physician there that he probably has himself worked up in a cycle due to the EBV situation. ).    HPI     Hot and cold symtpoms over the weekend     Felt extremely fatigued     Thinks there might be an anxiety component     Weight is down      Thinks he just got himself worked up     [facility]   CT  normal labs normal      Tight sometimes painful neck on right side where he feels lymph node, he thinks         Used to be on testosterone but I recommended he stop that in past years as taking exogenous testosterone suppresses your own production     Was told his pituitary didn't work well (fsh lh ) by me in past     Hasn't been on testosterone for a long time     Had this mono -   so exhaused and stressed lately     Feels like he has worked himself up into thinking he had leukemia or lymphoma lately -    reports feeling less anxious since CT came back negative    Very reassured by imaging, labs at recent ER visit -+    Still has appt upcoming with ID       +    Review of Systems   Psychiatric/Behavioral:  Positive for dysphoric mood. The patient is nervous/anxious.        Objective   /73 (BP Location: Right arm, Patient Position: Sitting, BP Cuff Size: Adult)   Pulse 61   Temp 36.3 °C (97.4 °F) (Temporal)   Wt 75.2 kg (165 lb 12.8 oz)   SpO2 98%   BMI 24.48 kg/m²     Physical Exam  Constitutional:       General: He is not in acute distress.     Appearance: Normal appearance.   Cardiovascular:      Rate and Rhythm: Normal rate and regular rhythm.      Heart sounds: Normal heart sounds.   Pulmonary:      Effort: Pulmonary effort is normal.      Breath sounds: Normal breath sounds. No wheezing, rhonchi or rales.   Abdominal:      Palpations: Abdomen is soft.      Tenderness: There  "is no abdominal tenderness. There is no guarding or rebound.   Musculoskeletal:      Right lower leg: No edema.      Left lower leg: No edema.      Comments: Scalenes,  traps,  scm tight bilat but right greater than left,   elevated 1st rib right     No lymph nodes palpated on exam ant cervical/ post cervical        Neurological:      Mental Status: He is alert.   Psychiatric:         Mood and Affect: Mood normal.         Assessment/Plan   Diagnoses and all orders for this visit:  Right shoulder pain, unspecified chronicity  -     Referral to Physical Therapy; Future  Neck pain  -     Referral to Physical Therapy; Future  Other fatigue  -     Testosterone, total and free; Future  -     TSH with reflex to Free T4 if abnormal; Future  -     FSH & LH; Future  -     Prolactin; Future  -     Cortisol AM; Future  -     Insulin like growth factor; Future  -     Ferritin; Future  -     Iron and TIBC; Future  -     Sex Hormone Binding Globulin; Future  Elevated ferritin  -     Ferritin; Future  -     Iron and TIBC; Future  Low testosterone  -     FSH & LH; Future  -     Prolactin; Future  -     Cortisol AM; Future  -     Insulin like growth factor; Future  -     Sex Hormone Binding Globulin; Future    Consider effects of nature on a persons health, patient challenged to get outdoors and unplugged (no phone, no music, no book, etc) for 10-15 minutes daily. Focus on things such as the sounds and smells.     Calm baldo -   search for anxiety      \"letting go of anxiety\"  pranay puente     Unwinding anxiety - book ALEX Amato     PT for head and neck      Lifestyle recommendations for overall wellness, as you are interested or able -     We recommend limitation of refined carbohydrates such as pasta, pretzels, chips, breads, bagels or cereals- particularly white flour containing.   Limit sugar sweetened foods or beverages, alcohol, pastries, candy, sports drinks or sodas.  (Water is best.)  Minimize diet drinks with artificial " sugars.  If craving a sweetened food or beverage, two sweeteners from natural sources without substantial calories or glucose-raising properties are monk fruit extract or stevia.     Shoot for drinking 64 oz water daily unless conditions such as heart failure limit your recommended intake (Ask your doctor if you're not sure.)    Whole grain foods can be part of a healthy diet, and include such things as steel cut oats, brown rice, quinoa, millet, Brendan or other sprouted breads (often found in the freezer section), amaranth, teff, farrow., etc.  Wheat/buckwheat/spelt can be added if you are not gluten-sensitive, and if these foods don't cause you bloating or symptoms of inflammation.       We do recommend eating lots of vegetables- 5 -7 servings of veggies daily, which is good fiber source as well as vitamins and minerals. (Think egg bake with spinach, peppers, onions, for breakfast,  celery with nut butter, or hummus and cucumbers as snacks,  salads with lunch and/or dinner, 1 - 2 veggies with lunch and or dinner,  etc)    Eat lean proteins -shoot for 70 - 100 g of protein per day unless you have restrictions from kidney disease, etc.  Think hard boiled eggs, beans, seeds/lentils, cottage cheese, consider protein powder such as whey or pea powder in steel cut oats or a homemade smoothie.     Eat some healthy fat daily, such as a handful of almonds, walnuts, pumpkin seeds, a serving of salmon, a splash of olive oil on your salad, an avocado.       Healthy nutritional choices decrease conditions like elevated cholesterol, elevated sugars, elevated weight, elevated blood pressure, and elevated inflammation.    Healthy choices can also lower risk of events such as strokes, heart attacks, and cancer.     Make most of your food intake plant- based.   Have occasional treats if you like, but try not to overindulge.     Some sort of heart-rate increasing movement or exercise is recommended 4 - 6 days per week, even if in 5  or 10 min increments several times throughout the day.     Include within that exercise time some strength/ resistance exercises at least 3 days per week.  This can be through use of resistance bands, free weights, machines, body weight lifting, heavy outdoor chores such as yardwork, mulching, chopping wood, etc.   Stretching/range of motion exercises should also be included as part of your exercise time - such as yoga, mady chi, or even just post- exercise stretching.  Moving exercised muscles beyond day to day usual activities can help you stay limber and flexible, decrease injury risk, and minimize aches and pains with everyday movements.     Either in combination with exercise or just as a self-care quiet practice, spending time in nature has a wide range of positive effects on your health and wellbeing, including improved mood, improved blood pressure, and improved immune function.   Even 10 - 20 minutes a few days per week can make a difference.     Consider visiting [email or web address]     This site is a new partnership between [facility] and local physicians to include a prescription for nature as part of your self-care and wellness.      Healthy sleep ( 6 - 8 hours if possible) and  involvement in community (neighbors, family, senior center, hobby groups, dilip based realationships, etc) are also important parts of overall well being.      Pick one or two things to focus on per week or month and start building on your wellness promoting activities.   You deserve it!     Make follow up if anxiety worsens     Will make follow up with endocrine if testosterone low -  if pt can get cancellation visit with me before Jan 28 can follow up w me for next steps -    will be Dr Garcia or Bret for depr /anx  as new pt     Follow up in 3 mo   scales dr garcia,  sooner as needed

## 2024-11-27 NOTE — PATIENT INSTRUCTIONS
"Consider effects of nature on a persons health, patient challenged to get outdoors and unplugged (no phone, no music, no book, etc) for 10-15 minutes daily. Focus on things such as the sounds and smells.     Calm baldo -   search for anxiety      \"letting go of anxiety\"  pranay puente     Unwinding anxiety - book ALEX Amato     PT for head and neck      Lifestyle recommendations for overall wellness, as you are interested or able -     We recommend limitation of refined carbohydrates such as pasta, pretzels, chips, breads, bagels or cereals- particularly white flour containing.   Limit sugar sweetened foods or beverages, alcohol, pastries, candy, sports drinks or sodas.  (Water is best.)  Minimize diet drinks with artificial sugars.  If craving a sweetened food or beverage, two sweeteners from natural sources without substantial calories or glucose-raising properties are monk fruit extract or stevia.     Shoot for drinking 64 oz water daily unless conditions such as heart failure limit your recommended intake (Ask your doctor if you're not sure.)    Whole grain foods can be part of a healthy diet, and include such things as steel cut oats, brown rice, quinoa, millet, Brendan or other sprouted breads (often found in the freezer section), amaranth, teff, farrow., etc.  Wheat/buckwheat/spelt can be added if you are not gluten-sensitive, and if these foods don't cause you bloating or symptoms of inflammation.       We do recommend eating lots of vegetables- 5 -7 servings of veggies daily, which is good fiber source as well as vitamins and minerals. (Think egg bake with spinach, peppers, onions, for breakfast,  celery with nut butter, or hummus and cucumbers as snacks,  salads with lunch and/or dinner, 1 - 2 veggies with lunch and or dinner,  etc)    Eat lean proteins -shoot for 70 - 100 g of protein per day unless you have restrictions from kidney disease, etc.  Think hard boiled eggs, beans, seeds/lentils, cottage " cheese, consider protein powder such as whey or pea powder in steel cut oats or a homemade smoothie.     Eat some healthy fat daily, such as a handful of almonds, walnuts, pumpkin seeds, a serving of salmon, a splash of olive oil on your salad, an avocado.       Healthy nutritional choices decrease conditions like elevated cholesterol, elevated sugars, elevated weight, elevated blood pressure, and elevated inflammation.    Healthy choices can also lower risk of events such as strokes, heart attacks, and cancer.     Make most of your food intake plant- based.   Have occasional treats if you like, but try not to overindulge.     Some sort of heart-rate increasing movement or exercise is recommended 4 - 6 days per week, even if in 5 or 10 min increments several times throughout the day.     Include within that exercise time some strength/ resistance exercises at least 3 days per week.  This can be through use of resistance bands, free weights, machines, body weight lifting, heavy outdoor chores such as yardwork, mulching, chopping wood, etc.   Stretching/range of motion exercises should also be included as part of your exercise time - such as yoga, mady chi, or even just post- exercise stretching.  Moving exercised muscles beyond day to day usual activities can help you stay limber and flexible, decrease injury risk, and minimize aches and pains with everyday movements.     Either in combination with exercise or just as a self-care quiet practice, spending time in nature has a wide range of positive effects on your health and wellbeing, including improved mood, improved blood pressure, and improved immune function.   Even 10 - 20 minutes a few days per week can make a difference.     Consider visiting [email or web address]     This site is a new partnership between [facility] and local physicians to include a prescription for nature as part of your self-care and wellness.      Healthy sleep ( 6 - 8  hours if possible) and  involvement in community (neighbors, family, senior center, hobby groups, dilip based realationships, etc) are also important parts of overall well being.      Pick one or two things to focus on per week or month and start building on your wellness promoting activities.   You deserve it!     Follow up in 3 mo   scales dr garcia  - soonewa as needed for anxiety,  can refer to endo for testosterone

## 2024-11-29 ENCOUNTER — LAB (OUTPATIENT)
Dept: LAB | Facility: LAB | Age: 59
End: 2024-11-29
Payer: COMMERCIAL

## 2024-11-29 DIAGNOSIS — R79.89 ELEVATED FERRITIN: ICD-10-CM

## 2024-11-29 DIAGNOSIS — R53.83 OTHER FATIGUE: ICD-10-CM

## 2024-11-29 DIAGNOSIS — R79.89 LOW TESTOSTERONE: ICD-10-CM

## 2024-11-29 LAB
CORTIS AM PEAK SERPL-MSCNC: 21.4 UG/DL (ref 5–20)
FERRITIN SERPL-MCNC: 343 NG/ML (ref 20–300)
FSH SERPL-ACNC: 1.7 IU/L
IRON SATN MFR SERPL: 25 % (ref 25–45)
IRON SERPL-MCNC: 98 UG/DL (ref 35–150)
LH SERPL-ACNC: 1.2 IU/L
PROLACTIN SERPL-MCNC: 4.3 UG/L (ref 2–18)
TIBC SERPL-MCNC: 396 UG/DL (ref 240–445)
TSH SERPL-ACNC: 2.93 MIU/L (ref 0.44–3.98)
UIBC SERPL-MCNC: 298 UG/DL (ref 110–370)

## 2024-11-29 PROCEDURE — 84443 ASSAY THYROID STIM HORMONE: CPT

## 2024-11-29 PROCEDURE — 83001 ASSAY OF GONADOTROPIN (FSH): CPT

## 2024-11-29 PROCEDURE — 82533 TOTAL CORTISOL: CPT

## 2024-11-29 PROCEDURE — 83540 ASSAY OF IRON: CPT

## 2024-11-29 PROCEDURE — 84305 ASSAY OF SOMATOMEDIN: CPT

## 2024-11-29 PROCEDURE — 36415 COLL VENOUS BLD VENIPUNCTURE: CPT

## 2024-11-29 PROCEDURE — 83002 ASSAY OF GONADOTROPIN (LH): CPT

## 2024-11-29 PROCEDURE — 84146 ASSAY OF PROLACTIN: CPT

## 2024-11-29 PROCEDURE — 84270 ASSAY OF SEX HORMONE GLOBUL: CPT

## 2024-11-29 PROCEDURE — 83550 IRON BINDING TEST: CPT

## 2024-11-29 PROCEDURE — 84402 ASSAY OF FREE TESTOSTERONE: CPT

## 2024-11-29 PROCEDURE — 82728 ASSAY OF FERRITIN: CPT

## 2024-11-30 LAB
IGF-I SERPL-MCNC: 177 NG/ML (ref 56–203)
IGF-I Z-SCORE SERPL: 1.3
SHBG SERPL-SCNC: 16 NMOL/L (ref 19–76)

## 2024-12-01 PROBLEM — R79.89 LOW TESTOSTERONE: Status: ACTIVE | Noted: 2024-12-01

## 2024-12-01 PROBLEM — R53.83 OTHER FATIGUE: Status: ACTIVE | Noted: 2024-12-01

## 2024-12-01 PROBLEM — M25.511 RIGHT SHOULDER PAIN: Status: ACTIVE | Noted: 2024-12-01

## 2024-12-01 PROBLEM — R79.89 ELEVATED FERRITIN: Status: ACTIVE | Noted: 2024-12-01

## 2024-12-01 PROBLEM — M54.2 NECK PAIN: Status: ACTIVE | Noted: 2024-12-01

## 2024-12-01 ASSESSMENT — ENCOUNTER SYMPTOMS
NERVOUS/ANXIOUS: 1
DYSPHORIC MOOD: 1

## 2024-12-02 DIAGNOSIS — I10 HYPERTENSION, UNSPECIFIED TYPE: ICD-10-CM

## 2024-12-02 RX ORDER — HYDROCHLOROTHIAZIDE 12.5 MG/1
12.5 CAPSULE ORAL DAILY
Qty: 90 CAPSULE | Refills: 3 | Status: SHIPPED | OUTPATIENT
Start: 2024-12-02

## 2024-12-04 ENCOUNTER — TELEPHONE (OUTPATIENT)
Dept: PRIMARY CARE | Facility: CLINIC | Age: 59
End: 2024-12-04
Payer: COMMERCIAL

## 2024-12-06 ENCOUNTER — TELEPHONE (OUTPATIENT)
Dept: PRIMARY CARE | Facility: CLINIC | Age: 59
End: 2024-12-06
Payer: COMMERCIAL

## 2024-12-06 DIAGNOSIS — R63.4 UNEXPLAINED WEIGHT LOSS: ICD-10-CM

## 2024-12-06 DIAGNOSIS — R79.89 LOW TESTOSTERONE: Primary | ICD-10-CM

## 2024-12-06 DIAGNOSIS — R53.83 OTHER FATIGUE: ICD-10-CM

## 2024-12-06 LAB
TESTOSTERONE FREE (CHAN): 49.5 PG/ML (ref 35–155)
TESTOSTERONE,TOTAL,LC-MS/MS: 227 NG/DL (ref 250–1100)

## 2024-12-06 NOTE — TELEPHONE ENCOUNTER
Per result note-    Advised pt. Pt had no further questions/concerns. Will call pt when rest of his labs come in.

## 2024-12-06 NOTE — TELEPHONE ENCOUNTER
Fatigue and weight loss can be releated to low t     Usually not anxiety     Pls have pt make appt with new pcp for anxiety follow up when he is able      (I have no slots left before I leave , I'm so very sorry)      I placed an order for endo for T mgt for endo

## 2024-12-09 RX ORDER — MONTELUKAST SODIUM 10 MG/1
TABLET ORAL
COMMUNITY
Start: 2024-11-30

## 2024-12-09 NOTE — TELEPHONE ENCOUNTER
Called patient.  He is now having higher blood pressures than normal.  Scheduled appt with Dr. Godinez tomorrow, first thing.  I also gave him Theres's phone number to schedule with Casey.

## 2024-12-09 NOTE — PROGRESS NOTES
FAMILY MEDICINE  OFFICE VISIT   Damion Williamson  [id number]  1965    PCP: Karlene Hicks DO     Chief Complaint:   Chief Complaint   Patient presents with    Hypertension     Pt presents for elevated BP, anxiety.   Pt declines flu vaccine.     SUBJECTIVE     Damion Williamson is a 59 y.o. English-speaking male, who presents to the clinic with complaints of anxiety and HTN concerns.     New Concerns   - Saturday night had a xmas party at [place]  - Had a out of body experience  - /92 when checked it the next morning.   - Crazy tinnitus in bilateral   - Sweaty palms and feet  - At work, can't focus or grasp what they are saying.   - Tried meditation  - Feels like he is becoming a worry wort   - Yesterday had no anxiety, great day. Went to store and was completely exhausted.   - Axillary and right neck area hurting again.   - Father of 4   - SO-7 Total Score: 13 (12/10/2024  9:58 AM)   - Makes it very difficult to focus with work.   - Low appetite, weight is down. Typically around 170lbs this time of year.   - Eating healthier now that kids out of house.   - Had an MRI Brain in the past at [facility]   - Night time has red hot ears and sweating  - Fluctuations with hot cold recently.       The following portions of the patient's chart were reviewed in this encounter and updated as appropriate:  Tobacco  Allergies  Meds  Problems  Med Hx  Surg Hx  Fam Hx         Home Medication List:  Current Outpatient Medications   Medication Instructions    ascorbic acid (VITAMIN C ORAL) Take by mouth.    aspirin 81 mg, Daily    cholecalciferol, vitamin D3, (VITAMIN D3 ORAL) Take by mouth.    docosahexaenoic acid/epa (FISH OIL ORAL) Take by mouth.    hydroCHLOROthiazide (MICROZIDE) 12.5 mg, oral, Daily    levothyroxine (SYNTHROID, LEVOXYL) 75 mcg, oral, Daily, as directed    magnesium 200 mg tablet Take by mouth.    montelukast (Singulair) 10 mg tablet     multivitamin tablet 1 tablet, Daily    olmesartan  (BENICAR) 10 mg, oral, Daily    rosuvastatin (Crestor) 10 mg tablet TAKE 1 TABLET DAILY         OBJECTIVE   /81 (BP Location: Left arm, Patient Position: Sitting, BP Cuff Size: Adult)   Pulse 59   Temp 36.7 °C (98 °F) (Temporal)   Resp 14   Wt 74.4 kg (164 lb 1.6 oz)   SpO2 96%   BMI 24.23 kg/m²   Vital signs and pulse oximetry reviewed.     Physical Exam  Vitals and nursing note reviewed.   Constitutional:       Appearance: Normal appearance.   HENT:      Head: Normocephalic and atraumatic.      Right Ear: External ear normal.      Left Ear: External ear normal.      Nose: Nose normal. No congestion or rhinorrhea.   Eyes:      General: No scleral icterus.     Conjunctiva/sclera: Conjunctivae normal.   Cardiovascular:      Rate and Rhythm: Normal rate and regular rhythm.      Heart sounds: No murmur heard.  Pulmonary:      Effort: Pulmonary effort is normal. No respiratory distress.      Breath sounds: Normal breath sounds. No wheezing, rhonchi or rales.   Abdominal:      General: Abdomen is flat. Bowel sounds are normal. There is no distension.      Tenderness: There is no abdominal tenderness. There is no guarding.      Hernia: No hernia is present.   Musculoskeletal:      Right lower leg: No edema.      Left lower leg: No edema.   Skin:     General: Skin is warm.      Coloration: Skin is not jaundiced.   Neurological:      Mental Status: He is alert. Mental status is at baseline.   Psychiatric:         Attention and Perception: Attention and perception normal.         Mood and Affect: Mood is anxious. Affect is tearful.         Behavior: Behavior normal. Behavior is not slowed, withdrawn or hyperactive. Behavior is cooperative.         Cognition and Memory: Cognition normal.         ASSESSMENT & PLAN     Problem List Items Addressed This Visit       Hypertension - Primary    Overview     Suspect component of white coat hypertension, home numbers normotensive via third validated cuff.    Current  regimen:  HCTZ 12.5 mg daily  Olmesartan 20 mg daily, started 7/2023    Prior medications:   Lisinopril discontinued 7/2023 due to cough  Propranolol discontinued in the past due to bradycardia.         Current Assessment & Plan     Patient /81 today.  He has had large fluctuations with his blood pressure over the last couple years.  Recently has been having some odd symptoms, with sweaty palms and feet, inability to focus, tinnitus in both ears, weight loss, and lymphadenopathy.  -Patient has referrals been made out to endocrinology to rule out adrenal versus hormonal causes of his symptoms.  He has had low LH and low FSH on prior labs as well as low testosterone.  His a.m. cortisol was elevated at 1 point.  -We will obtain a CT head without contrast at this time to rule out any structural causes of this including a mass.  I did discuss that CT may miss certain lesions, an MRI may be the best choice, but we will start here.  -We will continue with his endocrinology appointment as scheduled.  -If he still is having issues and endocrinology workup is unremarkable, GEN surge and ID both do not feel his symptoms are coming from mono or do not have any plan for biopsy of his lymphadenopathy, we will refer him to hematology oncology for last resort referral.         Anxiety about health    Current Assessment & Plan     Patient has a number of things recently going on, so I explained symptoms.  He has had worsening anxiety and today his SO is 13.  He is not currently taking anything for anxiety.  - We discussed options for treatment, which include medications, counseling, and apps.   - Provided counseling resources for [facility] in [place].   - Provided calming baldo information.   - We discussed 3 tools to utilize prior to seeing the counselor formally: 4-1-4 breathing, calm apps on the phone, and tactile stress relief techniques.   - Shared decision making with the patient, patient elects:  counseling.    - Patient is going to call to set-up a counseling appointment.   - Patient has a support system with: spouse, children, family, Yazdanism.   - Sami and I discussed the national suicide hotline, which can be called or texted in a time of crisis or need. Text or call 988 or use chat feature at [employer].   - We will have close 3 month follow-up for mood.   - I discussed with patient if he feels like this appointment needs to be moved up and he needs to see me sooner, to call our office for an earlier appointment.           Other Visit Diagnoses       Tinnitus of both ears        Relevant Orders    CT head wo IV contrast    Low serum follicle stimulating hormone (FSH)        Relevant Orders    CT head wo IV contrast    Low serum luteinizing hormone (LH)        Relevant Orders    CT head wo IV contrast            [address]    Follow-Up Recommendations: 3mo mood, BP     Please excuse any typos or grammatical errors, part of this note was constructed with Dragon dictation software.    Carmen Godinez DO, [place]  [facility]   Office: [phone]  12/10/2024 10:13 AM

## 2024-12-10 ENCOUNTER — OFFICE VISIT (OUTPATIENT)
Dept: PRIMARY CARE | Facility: CLINIC | Age: 59
End: 2024-12-10
Payer: COMMERCIAL

## 2024-12-10 VITALS
RESPIRATION RATE: 14 BRPM | DIASTOLIC BLOOD PRESSURE: 81 MMHG | TEMPERATURE: 98 F | WEIGHT: 164.1 LBS | SYSTOLIC BLOOD PRESSURE: 163 MMHG | HEART RATE: 59 BPM | BODY MASS INDEX: 24.23 KG/M2 | OXYGEN SATURATION: 96 %

## 2024-12-10 DIAGNOSIS — I10 HYPERTENSION, UNSPECIFIED TYPE: Primary | ICD-10-CM

## 2024-12-10 DIAGNOSIS — H93.13 TINNITUS OF BOTH EARS: ICD-10-CM

## 2024-12-10 DIAGNOSIS — R79.89 LOW SERUM FOLLICLE STIMULATING HORMONE (FSH): ICD-10-CM

## 2024-12-10 DIAGNOSIS — R79.89 LOW SERUM LUTEINIZING HORMONE (LH): ICD-10-CM

## 2024-12-10 DIAGNOSIS — F41.8 ANXIETY ABOUT HEALTH: ICD-10-CM

## 2024-12-10 PROCEDURE — 3079F DIAST BP 80-89 MM HG: CPT | Performed by: STUDENT IN AN ORGANIZED HEALTH CARE EDUCATION/TRAINING PROGRAM

## 2024-12-10 PROCEDURE — 99213 OFFICE O/P EST LOW 20 MIN: CPT | Performed by: STUDENT IN AN ORGANIZED HEALTH CARE EDUCATION/TRAINING PROGRAM

## 2024-12-10 PROCEDURE — 3077F SYST BP >= 140 MM HG: CPT | Performed by: STUDENT IN AN ORGANIZED HEALTH CARE EDUCATION/TRAINING PROGRAM

## 2024-12-10 RX ORDER — MAGNESIUM 200 MG
TABLET ORAL
COMMUNITY

## 2024-12-10 ASSESSMENT — ANXIETY QUESTIONNAIRES
5. BEING SO RESTLESS THAT IT IS HARD TO SIT STILL: MORE THAN HALF THE DAYS
IF YOU CHECKED OFF ANY PROBLEMS ON THIS QUESTIONNAIRE, HOW DIFFICULT HAVE THESE PROBLEMS MADE IT FOR YOU TO DO YOUR WORK, TAKE CARE OF THINGS AT HOME, OR GET ALONG WITH OTHER PEOPLE: SOMEWHAT DIFFICULT
7. FEELING AFRAID AS IF SOMETHING AWFUL MIGHT HAPPEN: NEARLY EVERY DAY
2. NOT BEING ABLE TO STOP OR CONTROL WORRYING: MORE THAN HALF THE DAYS
6. BECOMING EASILY ANNOYED OR IRRITABLE: NOT AT ALL
1. FEELING NERVOUS, ANXIOUS, OR ON EDGE: MORE THAN HALF THE DAYS
3. WORRYING TOO MUCH ABOUT DIFFERENT THINGS: MORE THAN HALF THE DAYS
4. TROUBLE RELAXING: MORE THAN HALF THE DAYS
GAD7 TOTAL SCORE: 13

## 2024-12-10 NOTE — PATIENT INSTRUCTIONS
- Check out [facility] at: [email or web address] or call at: [phone] to set-up an appointment with a counselor.   - If you believe you are truly in an emergent situation and need immediate care, please call 911 or go to your nearest Emergency Department.   - National Suicide and Crisis Lifeline: Text and/or Call 972

## 2024-12-15 PROBLEM — F41.8 ANXIETY ABOUT HEALTH: Status: ACTIVE | Noted: 2024-12-15

## 2024-12-15 NOTE — ASSESSMENT & PLAN NOTE
Patient /81 today.  He has had large fluctuations with his blood pressure over the last couple years.  Recently has been having some odd symptoms, with sweaty palms and feet, inability to focus, tinnitus in both ears, weight loss, and lymphadenopathy.  -Patient has referrals been made out to endocrinology to rule out adrenal versus hormonal causes of his symptoms.  He has had low LH and low FSH on prior labs as well as low testosterone.  His a.m. cortisol was elevated at 1 point.  -We will obtain a CT head without contrast at this time to rule out any structural causes of this including a mass.  I did discuss that CT may miss certain lesions, an MRI may be the best choice, but we will start here.  -We will continue with his endocrinology appointment as scheduled.  -If he still is having issues and endocrinology workup is unremarkable, GEN surge and ID both do not feel his symptoms are coming from mono or do not have any plan for biopsy of his lymphadenopathy, we will refer him to hematology oncology for last resort referral.

## 2024-12-15 NOTE — ASSESSMENT & PLAN NOTE
>>ASSESSMENT AND PLAN FOR ANXIETY ABOUT HEALTH WRITTEN ON 12/15/2024  6:00 PM BY MARCELO CHEN, DO    Patient has a number of things recently going on, so I explained symptoms.  He has had worsening anxiety and today his SO is 13.  He is not currently taking anything for anxiety.  - We discussed options for treatment, which include medications, counseling, and apps.   - Provided counseling resources for [facility] in [place].   - Provided calming baldo information.   - We discussed 3 tools to utilize prior to seeing the counselor formally: 4-1-4 breathing, calm apps on the phone, and tactile stress relief techniques.   - Shared decision making with the patient, patient elects: counseling.    - Patient is going to call to set-up a counseling appointment.   - Patient has a support system with: spouse, children, family, Religious.   - Sami and I discussed the national suicide hotline, which can be called or texted in a time of crisis or need. Text or call 988 or use chat feature at [email or web address].   - We will have close 3 month follow-up for mood.   - I discussed with patient if he feels like this appointment needs to be moved up and he needs to see me sooner, to call our office for an earlier appointment.

## 2024-12-15 NOTE — ASSESSMENT & PLAN NOTE
Patient has a number of things recently going on, so I explained symptoms.  He has had worsening anxiety and today his SO is 13.  He is not currently taking anything for anxiety.  - We discussed options for treatment, which include medications, counseling, and apps.   - Provided counseling resources for [facility] in [place].   - Provided calming baldo information.   - We discussed 3 tools to utilize prior to seeing the counselor formally: 4-1-4 breathing, calm apps on the phone, and tactile stress relief techniques.   - Shared decision making with the patient, patient elects: counseling.    - Patient is going to call to set-up a counseling appointment.   - Patient has a support system with: spouse, children, family, Pentecostal.   - Sami and I discussed the national suicide hotline, which can be called or texted in a time of crisis or need. Text or call [employer] or use chat feature at [email or web address].   - We will have close 3 month follow-up for mood.   - I discussed with patient if he feels like this appointment needs to be moved up and he needs to see me sooner, to call our office for an earlier appointment.

## 2024-12-17 ENCOUNTER — APPOINTMENT (OUTPATIENT)
Dept: SURGERY | Facility: CLINIC | Age: 59
End: 2024-12-17
Payer: COMMERCIAL

## 2024-12-17 VITALS
BODY MASS INDEX: 24.59 KG/M2 | RESPIRATION RATE: 17 BRPM | HEART RATE: 62 BPM | HEIGHT: 69 IN | WEIGHT: 166 LBS | OXYGEN SATURATION: 97 % | DIASTOLIC BLOOD PRESSURE: 79 MMHG | TEMPERATURE: 98 F | SYSTOLIC BLOOD PRESSURE: 158 MMHG

## 2024-12-17 DIAGNOSIS — R59.0 INGUINAL LYMPHADENOPATHY: ICD-10-CM

## 2024-12-17 DIAGNOSIS — R59.0 AXILLARY LYMPHADENOPATHY: Primary | ICD-10-CM

## 2024-12-17 PROCEDURE — 3008F BODY MASS INDEX DOCD: CPT | Performed by: SURGERY

## 2024-12-17 PROCEDURE — 3077F SYST BP >= 140 MM HG: CPT | Performed by: SURGERY

## 2024-12-17 PROCEDURE — 3078F DIAST BP <80 MM HG: CPT | Performed by: SURGERY

## 2024-12-17 PROCEDURE — 99213 OFFICE O/P EST LOW 20 MIN: CPT | Performed by: SURGERY

## 2024-12-17 NOTE — PROGRESS NOTES
"Established Patient Visit    Patient presents for 4-month follow-up regarding palpable lymph nodes.    Overall the patient is doing much better.  He has no right axillary pain.  That has resolved.  No right inguinal pain.  He was recently diagnosed with a right pectoral and scalene strain and is undergoing physical therapy and this has helped.  In addition, he did test positive for EBV IgG and IgM in October.    8/27/2024:  Damion Williamson \"Sami\" is a 58 y.o. male, patient of Dr. Karlene Hicks, who presents on referral from Dr. Deann Cole, for an axillary evaluation.     The patient was seen for routine office visit by Dr. Hicks on 7/29/2020 for and the patient noted right axillary swelling.  On examination, the following is noted: \"1 cm slight fullness right axilla, mildly tender to palpation.\"     This is followed by right axillary sonography on 8/5/2024.  I personally reviewed the report and images.  The impression for report: \"\"Nonspecific mildly prominent right axillary lymph nodes are demonstrated. Individual nodes measure Individual nodes measure 1.5 x 0.4 x 0.7 cm, 2.4 x 0.8  x 0.8 cm, and 1.1 x 0.6 x 0.6 cm. Otherwise nonspecific soft tissues  are seen within the right axillary region. No fluid collection is  demonstrated.\"  On my review, these lymph nodes demonstrated normal architecture and hilar fat, with no cortical thickening.     The patient was subsequent seen by Dr. Cole, and referred to general surgery for further evaluation and treatment.     To me, the patient notes that in late June of this year, he felt an odd sensation in the right axilla.  On palpation, he felt a small mass.  Since then, he has noted  intermittent soreness in the right axilla, particularly with activity such as yard work.  However, over the last 10 days or so, he has noted that the soreness and the mass or lump have become less severe and less prominent.   The patient denies any trauma or skin lesions scratches " etc. of the right upper extremity or chest prior to the appearance  small axillary mass.  The patient has never had any type of lymphadenopathy in the past.  He has had no major operations.      The patient notes that he saw me more than 5 years ago for a upper back skin lesion and this subsequently resolved.     Patient is .  Lives in [place].  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       Past Medical History   Medical History        Past Medical History:   Diagnosis Date    Encounter for screening for malignant neoplasm of prostate       Screening PSA (prostate specific antigen)    Localized swelling, mass and lump, unspecified 10/05/2017     Nodule, subcutaneous            Surgical History    Surgical History         Past Surgical History:   Procedure Laterality Date    TONSILLECTOMY   02/11/2014     Tonsillectomy With Adenoidectomy            Allergies   RX Allergies        Allergies   Allergen Reactions    Pravastatin Other            Home Meds       Current Outpatient Medications   Medication Instructions    albuterol (ProAir HFA) 90 mcg/actuation inhaler 2 puffs, inhalation, Every 4 hours PRN    aspirin 81 mg, oral, Daily    hydroCHLOROthiazide (MICROZIDE) 12.5 mg, oral, Daily    levothyroxine (SYNTHROID, LEVOXYL) 75 mcg, oral, Daily, as directed    montelukast (SINGULAIR) 10 mg, oral, Nightly    multivitamin tablet 1 tablet, oral, Daily    olmesartan (BENICAR) 10 mg, oral, Daily    rosuvastatin (Crestor) 10 mg tablet TAKE 1 TABLET DAILY         Family History    Family History          Family History   Problem Relation Name Age of Onset    Blood clot Mother Sadia Williamson      Hyperlipidemia Mother Sadia Williamson      Diabetes Father Loki Williamson      Heart disease Father Loki Williamson      Hernia Father Loki Williamson      Hyperlipidemia Father Loki Williamson      Hypertension Father Loki Williamson              Social History  Social History   Social History            Tobacco Use    Smoking  status: Never    Smokeless tobacco: Never   Substance Use Topics    Alcohol use: Yes       Alcohol/week: 2.0 standard drinks of alcohol       Types: 2 Glasses of wine per week    Drug use: Never            Review Of Systems    Review of Systems     General: Not Present- Obesity, Cancer, HIV, MRSA, Recent Cold/Flu, Tired During the Day and VRE.  HEENT: Not Present- Migraine, Cataracts, Glaucoma, Macular Degeneration and Retinal Detachment.  Respiratory:Not Present- Asthma, Chronic Cough, Difficulty Breathing on Exertion, Difficulty Breathing at Rest, Emphysema, Frequent Bronchitis, Home CPAP/BiPAP, Home Oxygen, Pulmonary Embolus, Pneumonia/TB, Sleep Apnea and Snoring.  Cardiovascular: Not Present- Chest Pain, Congestive Heart Failure, Heart Attack,  Heart Stent, Internal Defibrillator, Irregular Heart Beat, Mitral Valve Prolapse, Murmur, Pacemaker and Peripheral Vascular Disease.  Gastrointestinal: Not Present- Heartburn, Hepatitis, Hiatal Hernia, Jaundice, Reflux, Stomach Ulcer and IBS.  Male Genitourinary: Not Present- Enlarged Prostate, Kidney Failure, Kidney Stones, Dialysis and Urinary Tract Infection.  Musculoskeletal: Not Present- Arthritis, Back Pain and Fibromyalgia.  Neurological: Not Present- Headaches, Numbness, Tingling, Seizures, Stroke,  Shunt and Weakness.  Psychiatric: Not Present- Anxiety, Bipolar, Depression and Panic Attacks.  Endocrine: Not Present- Diabetes, Hyperthyroidism and Low Blood Sugar.  Hematology: Not Present- Abnormal Bleeding, Anemia and Blood Clots.     Vitals  There were no vitals taken for this visit.      Physical Exam   General Complete Physical Exam     The physical exam findings are as follows:     General Appearance - Cooperative and Well groomed. Not in acute distress. Build & Nutrition - Well nourished.  Posture - Normal posture. Gait - Normal. Hydration - Well hydrated.     Integumentary  General Characteristics: Overall examination of the patient's skin reveals - no  rashes, no suspicious lesions, no bruises and no evidence of scars. Color - normal coloration of skin. Skin Moisture - normal skin moisture. Temperature - normal  warmth is noted. Texture - normal skin texture.     Head and Neck  Head - normocephalic, atraumatic with no lesions or palpable masses.  Neck  Global Assessment - full range of motion. no bruit auscultated on the right, no bruit auscultated on the left, non-tender, no lymphadenopathy, no palpable mass on the right and no palpable mass on the left.  Trachea - midline.  Thyroid Gland Characteristics - no palpable nodules, normal position, symmetric and smooth.     ENMT  Global Assessment  Upon examination of the ears, nose, mouth and throat - examination of the oral cavity reveals normal lips, teeth, gums and oral mucosa and hard and soft palates, tongue, tonsils and posterior pharynx are moist and symmetric without lesions.     Chest and Lung Exam  Inspection: Shape - Symmetric. Movements - Symmetrical. Accessory muscles - No use of accessory muscles in breathing.  Percussion:  Quality and Intensity: - Percussion normal.  Palpation: - Palpation normal.  Auscultation:  Breath sounds: - Normal and equal bilaterally.  Adventitious sounds: - none bilaterally.     Cardiovascular  Inspection: Carotid artery - Bilateral - Inspection Normal.  Palpation/Percussion: Examination by palpation and percussion reveals - No Thrills.  Cardiac Borders - Normal.  Auscultation: Rhythm - Regular. Rate: Regular.  Heart Sounds - Normal heart sounds, S1 WNL and S2 WNL.  Murmurs & Other Heart Sounds: Auscultation of the heart reveals - No Murmurs or other extra heart sounds.     Abdomen  Inspection: Inspection of the abdomen reveals - No Hernias.  Palpation/Percussion: Palpation and Percussion of the abdomen reveal - Non Tender and No Palpable abdominal  masses.  Liver: - Normal.  Spleen: - Normal.  Auscultation: Auscultation of the abdomen reveals - Bowel sounds normal.  Surgical  scars: none     Rectal - Did not examine.     Lymphatic  Head & Neck  General Head & Neck Lymphatics:  Bilateral: Description - Normal.  Axillary  General Axillary Region:  Bilateral: Left is normal; on deep palpation along the inferior aspect of the right mid axilla, there is a 1-1.5 cm palpable nontender lymph node  Femoral & Inguinal  Generalized Femoral & Inguinal Lymphatics:  Bilateral: Description -left is normal; along the mid aspect of the right inguinal basin, there is a palpable ill-defined 1.0-1.5 cm nontender lymph node     Assessment/Plan     Mr. Williamson's clinical follow-up is satisfactory.    The small palpable right axillary lymph node is unchanged in size or character, and is now nontender.  The small nontender right inguinal lymph node again is unchanged in size or character.  No other adenopathy can be palpated in the cervical, axillary, epitrochlear, inguinal, or femoral regions.  These are reactive lymph nodes.       Recommendations:     Expectant management    No need for follow-up sonography     Continue monthly self exams    If the patient develops any new palpable masses or lesions, or any obviously enlarged lymph nodes, he will return to see me     Otherwise follow-up as needed

## 2024-12-24 ENCOUNTER — HOSPITAL ENCOUNTER (OUTPATIENT)
Dept: RADIOLOGY | Facility: CLINIC | Age: 59
Discharge: HOME | End: 2024-12-24
Payer: COMMERCIAL

## 2024-12-24 DIAGNOSIS — R79.89 LOW SERUM LUTEINIZING HORMONE (LH): ICD-10-CM

## 2024-12-24 DIAGNOSIS — H93.13 TINNITUS OF BOTH EARS: ICD-10-CM

## 2024-12-24 DIAGNOSIS — R79.89 LOW SERUM FOLLICLE STIMULATING HORMONE (FSH): ICD-10-CM

## 2024-12-24 PROCEDURE — 70450 CT HEAD/BRAIN W/O DYE: CPT

## 2024-12-24 PROCEDURE — 70450 CT HEAD/BRAIN W/O DYE: CPT | Performed by: RADIOLOGY

## 2024-12-27 ENCOUNTER — APPOINTMENT (OUTPATIENT)
Dept: INFECTIOUS DISEASES | Facility: CLINIC | Age: 59
End: 2024-12-27
Payer: COMMERCIAL

## 2025-01-22 ENCOUNTER — OFFICE VISIT (OUTPATIENT)
Dept: PRIMARY CARE | Facility: CLINIC | Age: 60
End: 2025-01-22
Payer: COMMERCIAL

## 2025-01-22 VITALS
WEIGHT: 164.9 LBS | HEART RATE: 61 BPM | OXYGEN SATURATION: 96 % | BODY MASS INDEX: 24.35 KG/M2 | TEMPERATURE: 97.5 F | DIASTOLIC BLOOD PRESSURE: 76 MMHG | SYSTOLIC BLOOD PRESSURE: 160 MMHG | RESPIRATION RATE: 14 BRPM

## 2025-01-22 DIAGNOSIS — F41.1 GENERALIZED ANXIETY DISORDER: Primary | ICD-10-CM

## 2025-01-22 DIAGNOSIS — M54.2 NECK PAIN: ICD-10-CM

## 2025-01-22 PROCEDURE — 99214 OFFICE O/P EST MOD 30 MIN: CPT | Performed by: STUDENT IN AN ORGANIZED HEALTH CARE EDUCATION/TRAINING PROGRAM

## 2025-01-22 PROCEDURE — 3078F DIAST BP <80 MM HG: CPT | Performed by: STUDENT IN AN ORGANIZED HEALTH CARE EDUCATION/TRAINING PROGRAM

## 2025-01-22 PROCEDURE — 1036F TOBACCO NON-USER: CPT | Performed by: STUDENT IN AN ORGANIZED HEALTH CARE EDUCATION/TRAINING PROGRAM

## 2025-01-22 PROCEDURE — 3077F SYST BP >= 140 MM HG: CPT | Performed by: STUDENT IN AN ORGANIZED HEALTH CARE EDUCATION/TRAINING PROGRAM

## 2025-01-22 RX ORDER — CITALOPRAM 10 MG/1
10 TABLET ORAL DAILY
Qty: 30 TABLET | Refills: 1 | Status: SHIPPED | OUTPATIENT
Start: 2025-01-22 | End: 2025-03-23

## 2025-01-22 RX ORDER — HYDROXYZINE HYDROCHLORIDE 10 MG/1
10 TABLET, FILM COATED ORAL 3 TIMES DAILY
Qty: 30 TABLET | Refills: 0 | Status: SHIPPED | OUTPATIENT
Start: 2025-01-22 | End: 2025-02-21

## 2025-01-22 ASSESSMENT — PATIENT HEALTH QUESTIONNAIRE - PHQ9
SUM OF ALL RESPONSES TO PHQ QUESTIONS 1-9: 11
3. TROUBLE FALLING OR STAYING ASLEEP OR SLEEPING TOO MUCH: MORE THAN HALF THE DAYS
SUM OF ALL RESPONSES TO PHQ9 QUESTIONS 1 AND 2: 4
6. FEELING BAD ABOUT YOURSELF - OR THAT YOU ARE A FAILURE OR HAVE LET YOURSELF OR YOUR FAMILY DOWN: NOT AT ALL
2. FEELING DOWN, DEPRESSED OR HOPELESS: MORE THAN HALF THE DAYS
9. THOUGHTS THAT YOU WOULD BE BETTER OFF DEAD, OR OF HURTING YOURSELF: NOT AT ALL
10. IF YOU CHECKED OFF ANY PROBLEMS, HOW DIFFICULT HAVE THESE PROBLEMS MADE IT FOR YOU TO DO YOUR WORK, TAKE CARE OF THINGS AT HOME, OR GET ALONG WITH OTHER PEOPLE: VERY DIFFICULT
4. FEELING TIRED OR HAVING LITTLE ENERGY: MORE THAN HALF THE DAYS
8. MOVING OR SPEAKING SO SLOWLY THAT OTHER PEOPLE COULD HAVE NOTICED. OR THE OPPOSITE, BEING SO FIGETY OR RESTLESS THAT YOU HAVE BEEN MOVING AROUND A LOT MORE THAN USUAL: SEVERAL DAYS
7. TROUBLE CONCENTRATING ON THINGS, SUCH AS READING THE NEWSPAPER OR WATCHING TELEVISION: SEVERAL DAYS
1. LITTLE INTEREST OR PLEASURE IN DOING THINGS: MORE THAN HALF THE DAYS
5. POOR APPETITE OR OVEREATING: SEVERAL DAYS

## 2025-01-22 ASSESSMENT — ANXIETY QUESTIONNAIRES
GAD7 TOTAL SCORE: 13
5. BEING SO RESTLESS THAT IT IS HARD TO SIT STILL: NEARLY EVERY DAY
2. NOT BEING ABLE TO STOP OR CONTROL WORRYING: MORE THAN HALF THE DAYS
6. BECOMING EASILY ANNOYED OR IRRITABLE: NOT AT ALL
7. FEELING AFRAID AS IF SOMETHING AWFUL MIGHT HAPPEN: SEVERAL DAYS
IF YOU CHECKED OFF ANY PROBLEMS ON THIS QUESTIONNAIRE, HOW DIFFICULT HAVE THESE PROBLEMS MADE IT FOR YOU TO DO YOUR WORK, TAKE CARE OF THINGS AT HOME, OR GET ALONG WITH OTHER PEOPLE: VERY DIFFICULT
4. TROUBLE RELAXING: MORE THAN HALF THE DAYS
3. WORRYING TOO MUCH ABOUT DIFFERENT THINGS: MORE THAN HALF THE DAYS
1. FEELING NERVOUS, ANXIOUS, OR ON EDGE: NEARLY EVERY DAY

## 2025-01-22 NOTE — ASSESSMENT & PLAN NOTE
Patient with diagnosis of SO last appt, last SO 13. Today SO 13, PHQ9 11. He has been having worsening symptoms. Wife is here with him today. Patient became tearful and expressed that he thinks he needs medication at this point.   - I agree with patient and his wife, and discussed all options for treatment of anxiety and depression. I advised patient given worsening symptoms, which are now impacting his day to day and work, we need to get this under control.   - At this point we will start Celexa 10mg every day. I advised patient he can start at night so if he does get fatigued with the medication, then this will help his sleep.   - I recommend he continue counseling with [facility] at [facility].   - I recommend he continue with yoga and his PT.  - We discussed options for treatment, which include medications, counseling, and apps.   - Shared decision making with the patient and his wife, patient elects: counseling, medication, yoga, PT.   - Patient has a support system with: spouse, children, family, Gnosticist.   - Sami and I discussed the national suicide hotline, which can be called or texted in a time of crisis or need. Text or call [employer] or use chat feature at [employer].   - We will have close 1 month follow-up for mood.   - I discussed with patient if he feels like this appointment needs to be moved up and he needs to see me sooner, to call our office for an earlier appointment.   - Patient Has an upcoming trip for work, that he is very fearful that he will to get on the plane.  I recommended he start the Celexa tonight to get this under control, but we will also provide him with PRN Atarax for a plane he states that Celexa has not improved his symptoms at that time.  - I recommended that he trial the Atarax over the weekend, to see how sedating it is for him, prior to going to the airport.  I recommended that he start with 1 tablet, but could take as many as 3 tablets for a total of 30 mg prior to him getting on  the plane. Rx sent.

## 2025-01-22 NOTE — PATIENT INSTRUCTIONS
Osteopathic Manipulative Medicine (OMM) or Osteopathic Manipulative Treatment/Therapy (OMT)  Doctors of osteopathy (DOs) literally take a hands-on approach to musculoskeletal disorders.  Osteopathic manipulation treatment (OMT) is used to help correct structural imbalances in your body, improve circulation, and relieve pain.     OVERVIEW  What is osteopathic nonoperative treatment (OMT)?  Osteopathic medical treatment is hands-on treatment method.  OMT is sometimes called osteopathic manipulative therapy or osteopathic manipulative medicine (OMM).  Doctors of osteopathic medicine use OMT/OMM to treat mechanical pain (muscle, tendon, or bone pain due to structural imbalance) and a wide range of health conditions.  DOs also use OMT to diagnose, treat, and prevent disease to help your body function better.  Using several OMT techniques, DOs apply gentle pressure to manipulate the muscles, soft tissues, and joints.  The treatment encourages your body to heal itself by ensuring that your bones and muscles are aligned and balanced properly.    Who needs osteopathic benefit of treatment (OMT)?  Most people get OMT to treat low back pain, but DO's use OMT to treat many conditions.    Babies, children, and adults can benefit from osteopathic benefit of therapy.    Pregnant women can get OMT to improve sleep and relieve pain during their pregnancy and postpartum.    OMT can also help infants who have colic.  People who have osteoporosis, bone cancer, or other joint concerns should not get osteopathic benefit of treatment.  Be sure to share your health history with your DO before starting this treatment.    What is a DO?  Doctors of osteopathic medicine believe that all the systems in the body work together and affect each other.  Also called an osteopath or DO these doctors focused on the body, mind, and spirit as part of one interconnected system.  A doctor of osteopathy practices osteopathic benefit of medicine, a type of  medicine that includes hands-on treatments.  DOC of special training and healing the musculoskeletal system, but they are licensed to treat the full range of health conditions.  Dos don't treat just symptoms. Using a holistic approach, they focus on you as a whole person to find out what's causing your symptoms. To help you feel your best, DOs often evaluate your:   Activity level and exercise habits  Diet  Environment  Lifestyle  Mental and physical health  Sleep habits  Stress levels    What does OMT treat?  Dos usually use osteopathic manipulative therapy for back pain relief. But OMT can treat many conditions, including:   Breathing issues like asthma and sinus infections  Bowel issues, such as irritable bowel syndrome (IBS) and constipation  Chronic pain, including fibromyalgia, arthritis, menstrual pain and migraines   Musculoskeletal problems like back and neck pain, joint pain, and carpal tunnel syndrome  Probably associated with pregnancy, such as swelling (edema), insomnia, and sciatica  Sports injuries and repetitive stress injuries  By helping respiratory structural imbalance, OMT improved intermittent blood circulation to the bodily organ involved - which can help improve health of that organ.    PROCEDURE DETAILS  What happens before osteopathic manipulative treatment (OMT)?  Your DO will ask about your symptoms, lifestyle, and other health concerns. It's important to share information about you sleep habits, activity level, diet, and mental health. DOs use this information to gain a clear picture of how your lifestyle affects you overall well-being.   Your DO will examine you by touching or pressing on different parts of your body. Depending on your symptoms, your DO may order imaging studies (like an X-ray or MRI) before starting OMT.     What happens during OMT?  During osteopathic manipulation, you'll stand up, sit, or lie down on an exam table. Your DO will touch your muscles and soft tissues and  "move your limbs in different positions. Thera are more than 40 OMT techniques . Your DO may use one technique or several of them in one session.   Depending on the technique, your DO may ask you to lay on your back, roll onto your side, or pull your knees to your chest. While you're in these positions, your DO will use pressure and gentle manipulation to stretch your muscles and move your joints into proper alignment. Your DO may ask you to hold and release of breath at specific times.  Ear DO may use slow, continuous pressure, or quick sudden manipulations.  Some of the movements may feel a little strange or awkward, but they should not hurt.  If you feel pain or discomfort during her treatment, [place] DO right away.    What happens after OMT?  Verbal response osteopathic and at the therapy differently.  You may feel sore for a day or 2 after treatment.  Some people feel tired after OMT.  Others may feel energized.  After each treatment session, you should:  Drink plenty of water.  We recommend drinking a full glass or bottle of water extra than you normally would during the day.  Staying hydrated allows toxins to flush out of your muscles after treatment.  Go for a walk.  A short walk helps your body \"settle in\" or just to the proper alignment and balance.  Take it easy.  Avoid rigorous physical activity for the next 24 hours after OMT.  Focus on breathing and allowing her body to time to rest.    RISKS & BENEFITS  One of the advantages of osteopathic manipulative treatment?  Osteopathic manipulative therapy treats conditions that affect every system in the body, including the musculoskeletal system, digestive system, nervous system, and immune system.  By realigning the body and restoring balance to bones and muscles, OMT allow your body to work better as a whole.  Using OMT, your DO can:  Address structural problems in the joints, muscles, and tissues  Improved circulation (how blood and other fluids flow " through the body)  Prevent health problems and help with the body heal itself by improving how the body works as a unit  Soothe tight muscles, relieve joint stiffness, and improve range of motion    What are the risks or complications of OMT?  OMT techniques are safe, so long as you do not have a contraindication to the treatment. There are no side effects involving these procedures.  Severe pain is not a normal side effect of OMT.    RECOVERY & OUTLOOK  When can I go back to my usual activities after osteopathic manipulative treatment?  After an OMT session, usually can go back to your usual activities in a day or 2.  You may feel little sore for couple days after treatment, but you should not feel any pain.  Talk to her provider about beginning it or resuming an exercise program and other activities.    WHEN TO CALL THE DOCTOR  When should I see my healthcare provider about osteopathic manipulative treatment?  Call your DO if soreness last longer than a few days after treatment.    See your provider right away if you have pain after OMT. Pain is not a normal side effect of osteopathic manipulative treatment.    Osteopathic manipulation is a safe, effective treatment for back pain and a wide range of health conditions.  Using this hands-on approach, deals realign your body, restore balance, and work with you to achieve optimum health.  OMT prevents disease so you can live a healthy lifestyle.  He may feel tired or sore for a little while after treatment, but he should not feel pain.  You may need multiple treatments over several weeks, but this will be discussed with you by your provider.    OMM BILLING  We bill OMM/OMT as a procedure. We only use on procedure code during that visit, the codes are listed below.   - 60924: 1-2 body regions  - 44202: 3-4 body regions  - 93662: 5-6 body regions  - 37494: 7-8 body regions  - 92016: 9-10 body regions

## 2025-01-22 NOTE — ASSESSMENT & PLAN NOTE
Patient with neck pain that he has been seeing PT for at [facility].  He reports this is very helpful.  - I advise he continue PT at this time.   - He feels most improvement when they do soft tissue massage in the neck, particularly on the left side.  I discussed with him OMM and offered if he wanted to have an OMM appointment in the office to call our office.  - I will include the OMM information on his AVS today.  Patient is going to check his MyChart for his AVS and determine whether or not he wants to schedule his follow-up.

## 2025-01-22 NOTE — PROGRESS NOTES
FAMILY MEDICINE  OFFICE VISIT   Damion Williamson  [id number]  1965    PCP: Carmen Godinez DO     Chief Complaint:   Chief Complaint   Patient presents with    DISCUSS ANXIETY ISSUES     Pt presents to discuss anxiety issues after having [place] Virus this past fall.     SUBJECTIVE     Damion Williamson is a 59 y.o. English-speaking male with pertinent PMHx of SO, who presents to the clinic with complaints of worsening anxiety. His wife Fernando is here with him today.     Anxiety   - Doing yoga on Wed and Friday at [facility]  - Seeing Altaf Guerrero at [facility].   - Palms and hands are sweating and clamming  - Scalenes and Rib 1 elevated. Going to PT at [facility].   - Works for [employer]   - Don't think can get on a plane   - SO-7 Total Score: 13 (1/22/2025  8:46 AM)  - Patient Health Questionnaire-9 Score: 11  - Fearful of his trip next week. Fearful to get on the plane which has never been an issues for him.   - Leaving Monday for the work trip.   - Had a bad issues yesterday while on phone with a client.       The following portions of the patient's chart were reviewed in this encounter and updated as appropriate:  Tobacco  Allergies  Meds  Problems  Med Hx  Surg Hx  Fam Hx         Home Medication List:  Current Outpatient Medications   Medication Instructions    ascorbic acid (VITAMIN C ORAL) Take by mouth.    aspirin 81 mg, Daily    cholecalciferol, vitamin D3, (VITAMIN D3 ORAL) Take by mouth.    docosahexaenoic acid/epa (FISH OIL ORAL) Take by mouth.    hydroCHLOROthiazide (MICROZIDE) 12.5 mg, oral, Daily    hydrOXYzine HCL (ATARAX) 10 mg, oral, 3 times daily, Take about 30min before getting on the plane.    levothyroxine (SYNTHROID, LEVOXYL) 75 mcg, oral, Daily, as directed    magnesium 200 mg tablet Take by mouth.    montelukast (Singulair) 10 mg tablet     multivitamin tablet 1 tablet, Daily    olmesartan (BENICAR) 10 mg, oral, Daily    rosuvastatin (Crestor) 10 mg tablet TAKE  1 TABLET DAILY         OBJECTIVE   /76 (BP Location: Left arm, Patient Position: Sitting, BP Cuff Size: Adult)   Pulse 61   Temp 36.4 °C (97.5 °F) (Temporal)   Resp 14   Wt 74.8 kg (164 lb 14.4 oz)   SpO2 96%   BMI 24.35 kg/m²   Vital signs and pulse oximetry reviewed.     Physical Exam  Vitals and nursing note reviewed.   Constitutional:       Appearance: Normal appearance.   HENT:      Head: Normocephalic and atraumatic.      Right Ear: External ear normal.      Left Ear: External ear normal.      Nose: Nose normal. No congestion or rhinorrhea.   Eyes:      General: No scleral icterus.     Conjunctiva/sclera: Conjunctivae normal.   Cardiovascular:      Rate and Rhythm: Normal rate and regular rhythm.      Heart sounds: No murmur heard.     Comments: HR ~60s  Pulmonary:      Effort: Pulmonary effort is normal. No respiratory distress.      Breath sounds: Normal breath sounds. No wheezing, rhonchi or rales.   Musculoskeletal:      Right lower leg: No edema.      Left lower leg: No edema.   Skin:     General: Skin is warm.      Coloration: Skin is not jaundiced.   Neurological:      Mental Status: He is alert. Mental status is at baseline.   Psychiatric:         Attention and Perception: Attention normal.         Mood and Affect: Mood is anxious and depressed. Mood is not elated. Affect is tearful. Affect is not labile, blunt, flat or inappropriate.         Behavior: Behavior normal. Behavior is cooperative.         Thought Content: Thought content does not include homicidal or suicidal ideation. Thought content does not include homicidal or suicidal plan.         Cognition and Memory: Cognition and memory normal.         Judgment: Judgment normal.      Comments: Hands trembling.          ASSESSMENT & PLAN     Problem List Items Addressed This Visit       Neck pain    Current Assessment & Plan     Patient with neck pain that he has been seeing PT for at [facility].  He reports this is very helpful.  -  I advise he continue PT at this time.   - He feels most improvement when they do soft tissue massage in the neck, particularly on the left side.  I discussed with him OMM and offered if he wanted to have an OMM appointment in the office to call our office.  - I will include the OMM information on his AVS today.  Patient is going to check his MyChart for his AVS and determine whether or not he wants to schedule his follow-up.         Generalized anxiety disorder - Primary    Current Assessment & Plan     Patient with diagnosis of SO last appt, last SO 13. Today SO 13, PHQ9 11. He has been having worsening symptoms. Wife is here with him today. Patient became tearful and expressed that he thinks he needs medication at this point.   - I agree with patient and his wife, and discussed all options for treatment of anxiety and depression. I advised patient given worsening symptoms, which are now impacting his day to day and work, we need to get this under control.   - At this point we will start Celexa 10mg every day. I advised patient he can start at night so if he does get fatigued with the medication, then this will help his sleep.   - I recommend he continue counseling with Altaf at [facility].   - I recommend he continue with yoga and his PT.  - We discussed options for treatment, which include medications, counseling, and apps.   - Shared decision making with the patient and his wife, patient elects: counseling, medication, yoga, PT.   - Patient has a support system with: spouse, children, family, Jehovah's witness.   - Sami and I discussed the national suicide hotline, which can be called or texted in a time of crisis or need. Text or call [employer] or use chat feature at [email or web address].   - We will have close 1 month follow-up for mood.   - I discussed with patient if he feels like this appointment needs to be moved up and he needs to see me sooner, to call our office for an earlier appointment.   - Patient Has an upcoming trip for  work, that he is very fearful that he will to get on the plane.  I recommended he start the Celexa tonight to get this under control, but we will also provide him with PRN Atarax for a plane he states that Celexa has not improved his symptoms at that time.  - I recommended that he trial the Atarax over the weekend, to see how sedating it is for him, prior to going to the airport.  I recommended that he start with 1 tablet, but could take as many as 3 tablets for a total of 30 mg prior to him getting on the plane. Rx sent.          Relevant Medications    citalopram (CeleXA) 10 mg tablet    hydrOXYzine HCL (Atarax) 10 mg tablet     Level 4    Follow-Up Recommendations: 1 month for mood     Please excuse any typos or grammatical errors, part of this note was constructed with Dragon dictation software.    Carmen Godinez DO, Natividad  [facility]   Office: [phone]  1/22/2025 10:18 AM

## 2025-02-03 NOTE — PROGRESS NOTES
Subjective        Sami Williamson is a 59 y.o. male who presents for      HPI:          No chief complaint on file.    What concern/ problem/pain/symptom  brings you here today?      how long has pt had sxs?      describe symptoms-    fever-  nausea-  vomiting-  diarrhea-  abdominal pain-  blood in urine-  Urine freq-  Pain with urination-      how often do symptoms occur?      has pt tried anything for current symptoms, including medications (OTC or prescription)  ?         has pt been seen recently for this problem ( within past 2-3 weeks) ?    if yes- where?    by who?    what treatment was provided?          7/2024- PSA- normal         Social History     Tobacco Use   Smoking Status Never    Passive exposure: Past   Smokeless Tobacco Never         Social History     Substance and Sexual Activity   Alcohol Use Yes    Alcohol/week: 2.0 standard drinks of alcohol    Types: 2 Glasses of wine per week          Review of Systems:    Review of Systems    Objective        There were no vitals filed for this visit.      Pt is A and O x3, NAD, nontoxic, well-hydrated   Head- normocephalic and atraumatic,   EYES- conjunctiva- normal   lids- normal  EARS/NOSE- normal external exam   CV- RRR without murmur  PULM- CTA bilaterally, normal respiratory effort  RESPIRATORY EFFORT- normal , no retractions or nasal flaring   ABD- normoactive BS's , soft, no HSM, no CVAT, NT   EXT- no edema,NT  SKIN- no abnormal skin lesions noted  NEURO- no focal deficits  PSYCH- pleasant, normal judgement and insight                  BP Readings from Last 3 Encounters:   01/22/25 160/76   12/17/24 158/79   12/10/24 163/81           Wt Readings from Last 3 Encounters:   01/22/25 74.8 kg (164 lb 14.4 oz)   12/17/24 75.3 kg (166 lb)   12/10/24 74.4 kg (164 lb 1.6 oz)         BMI Readings from Last 3 Encounters:   01/22/25 24.35 kg/m²   12/17/24 24.51 kg/m²   12/10/24 24.23 kg/m²       The number and complexity of problems addressed is considered  moderate.  The amount and/or complexity of data reviewed and analyzed is considered moderate. The risk of complications and/or morbidity/mortality of patient is considered moderate. Overall, this patient encounter is considered a moderate risk visit.        What are antibiotics?  Antibiotics are medicines that help people fight infections caused by bacteria. They work by killing bacteria that are in the body. These medicines come in many different forms, including pills, ointments, and liquids that are given by injection.    Antibiotics can do a lot of good. For people with serious bacterial infections, antibiotics can save lives. But people use them far too often, even when they're not needed. This is causing a very serious problem called antibiotic resistance. Antibiotic resistance happens when bacteria that have been exposed to an antibiotic change so that the antibiotic can no longer kill them. In those bacteria, the antibiotic has no effect. Because of this problem, doctors are having a harder and harder time treating infections. Experts worry that there will soon be infections that don't respond to any antibiotics.    When are antibiotics helpful?  Antibiotics can help people fight off infections caused by bacteria. They do not work on infections caused by viruses.    Some common bacterial infections that are treated with antibiotics include:    Strep throat    Pneumonia (an infection of the lungs)    Bladder infections    Infections you catch through sex, such as gonorrhea and chlamydia    When are antibiotics NOT helpful?    Antibiotics do not work on infections caused by viruses.    Antibiotics are not helpful for the common cold, because the common cold is caused by a virus.    Antibiotics are not helpful for the flu, because the flu is caused by a virus. (People with the flu can be treated with medicines called antiviral medicines, which are different from antibiotics.)      Even though antibiotics don't  work on infections caused by viruses, people sometimes believe that they do. That's because they took antibiotics for a viral infection before and then got better. The problem is that those people would have gotten better with or without an antibiotic. When they get better with the antibiotic, they think that's what cured them, when in reality the antibiotic had nothing to do with it.    What's the harm in taking antibiotics even if they might not help?  There are many reasons you should not take antibiotics unless you absolutely need them:    Antibiotics cause side effects, such as nausea, vomiting, and diarrhea. They can even make it more likely that you will get a different kind of infection, such as yeast infection (if you are a woman).    Allergies to antibiotics are common. You can develop an allergy to an antibiotic, even if you have not had a problem with it before. Some allergies are just unpleasant, causing rashes and itching. But some can be very serious and even life-threatening. It is better to avoid any risk of an allergy, if the antibiotic wouldn't help you anyway.    Overuse of antibiotics leads to antibiotic resistance. Using antibiotics when they are not needed gives bacteria a chance to change, so that the antibiotics cannot hurt them later on. People who have infections caused by antibiotic-resistant bacteria often have to be treated in the hospital with many different antibiotics. People can even die from these infections, because there is no antibiotic that will cure them.    When should I take antibiotics?  You should take antibiotics only when a doctor or nurse prescribes them to you. You should never take antibiotics prescribed to someone else, and you should not take antibiotics that were prescribed to you for a previous illness. When prescribing an antibiotic, doctors and nurses have to carefully pick the right antibiotic for a particular infection. Not all antibiotics work on all  "bacteria.    If you do have an infection caused by a bacteria, your doctor or nurse might want to find out what that bacteria is, and which antibiotics can kill it. They do this by taking a \"culture\" of the bacteria and growing it in the lab. But it's not possible to do a culture on someone who has already started an antibiotic. So if you start an antibiotic without input from a doctor or nurse it will be impossible to know if you have taken the right one.    What can I do to reduce antibiotic resistance?  Here are some things that can help:    Do not pressure your doctor or nurse for antibiotics when they do not think you need them.    If you are prescribed antibiotics, finish all of the medicine and take it exactly as directed. Never skip doses or stop taking the medicine without talking to your doctor or nurse.    Do not give antibiotics that were prescribed to you to anyone else.    What if my doctor prescribes an antibiotic that did not work for me before?  If an antibiotic did not work for you before, that does not mean it will never work for you. If you have used an antibiotic before and it did not work, tell your doctor. But keep in mind that the infection you had before might not have been caused by the same bacteria that you have now. The \"best\" antibiotic is the right one for the bacteria causing the infection, not for the person with the infection.    What if I am allergic to an antibiotic?  If you had a bad reaction to an antibiotic, tell your doctor or nurse about it. But do not assume you are allergic unless your doctor or nurse tells you that you are.    Many people who think they are allergic to an antibiotic are wrong. If you get nauseous after taking an antibiotic, that does not mean you are allergic to it. Nausea is a common side effect of many antibiotics. If you are a woman and you get a yeast infection after taking an antibiotic, that does not mean you are allergic to it. Yeast infections are " a common side effect of antibiotics.    Symptoms of antibiotic allergy can be mild and include a flat rash and itching. They can also be more serious and include:    Hives - Hives are raised, red patches of skin that are usually very itchy (picture 1).    Lip or tongue swelling    Trouble swallowing or breathing    Serious allergy symptoms can start right after you start taking an antibiotic if you are very sensitive. Less serious symptoms, on the other hand, often start a day or more later.    Almost all antibiotics may cause possible side effects of allergic reaction, nausea, vomiting, diarrhea- most worrisome caused by C. diff, and secondary yeast infection.      Assessment & Plan  Dysuria                  Increase fluids      Urine ordered                 Call if no better or if symptoms worsen

## 2025-02-05 ENCOUNTER — APPOINTMENT (OUTPATIENT)
Dept: PRIMARY CARE | Facility: CLINIC | Age: 60
End: 2025-02-05
Payer: COMMERCIAL

## 2025-02-05 DIAGNOSIS — R30.0 DYSURIA: Primary | ICD-10-CM

## 2025-02-13 DIAGNOSIS — F41.1 GENERALIZED ANXIETY DISORDER: ICD-10-CM

## 2025-02-15 ENCOUNTER — OFFICE VISIT (OUTPATIENT)
Dept: URGENT CARE | Age: 60
End: 2025-02-15
Payer: COMMERCIAL

## 2025-02-15 VITALS
HEIGHT: 69 IN | HEART RATE: 63 BPM | RESPIRATION RATE: 18 BRPM | OXYGEN SATURATION: 97 % | TEMPERATURE: 98.6 F | WEIGHT: 168 LBS | BODY MASS INDEX: 24.88 KG/M2

## 2025-02-15 DIAGNOSIS — N41.0 ACUTE PROSTATITIS: Primary | ICD-10-CM

## 2025-02-15 RX ORDER — CIPROFLOXACIN 250 MG/1
500 TABLET, FILM COATED ORAL 2 TIMES DAILY
Qty: 28 TABLET | Refills: 0 | Status: SHIPPED | OUTPATIENT
Start: 2025-02-15 | End: 2025-02-22

## 2025-02-15 RX ORDER — CIPROFLOXACIN 250 MG/1
500 TABLET, FILM COATED ORAL 2 TIMES DAILY
Qty: 56 TABLET | Refills: 0 | Status: SHIPPED | OUTPATIENT
Start: 2025-02-15 | End: 2025-02-15

## 2025-02-15 ASSESSMENT — ENCOUNTER SYMPTOMS
CONSTITUTIONAL NEGATIVE: 1
RESPIRATORY NEGATIVE: 1
DIFFICULTY URINATING: 1
EYES NEGATIVE: 1
CARDIOVASCULAR NEGATIVE: 1
PSYCHIATRIC NEGATIVE: 1
NEUROLOGICAL NEGATIVE: 1
MUSCULOSKELETAL NEGATIVE: 1
GASTROINTESTINAL NEGATIVE: 1

## 2025-02-15 ASSESSMENT — PAIN SCALES - GENERAL: PAINLEVEL_OUTOF10: 3

## 2025-02-15 NOTE — PROGRESS NOTES
"Subjective   Patient ID: Damion Williamson \"Sami\" is a 59 y.o. male. They present today with a chief complaint of Testicle Pain.    History of Present Illness  Traveling for business in [place], seen in outside clinic and treated for prostatitis with cipro BID x14 days. States he took last dose yesterday and felt s/s coming back. He will see his PCP in six days. Patient denies any CP, SOB, HA, fever, abdominal pain, and nausea/vomiting otherwise.      History provided by:  Patient  Testicle Pain      Past Medical History  Allergies as of 02/15/2025 - Reviewed 02/15/2025   Allergen Reaction Noted    Pravastatin Other 06/21/2023       (Not in a hospital admission)       Past Medical History:   Diagnosis Date    Encounter for screening for malignant neoplasm of prostate     Screening PSA (prostate specific antigen)    Localized swelling, mass and lump, unspecified 10/05/2017    Nodule, subcutaneous       Past Surgical History:   Procedure Laterality Date    TONSILLECTOMY  02/11/2014    Tonsillectomy With Adenoidectomy        reports that he has never smoked. He has been exposed to tobacco smoke. He has never used smokeless tobacco. He reports current alcohol use of about 2.0 standard drinks of alcohol per week. He reports that he does not use drugs.    Review of Systems  Review of Systems   Constitutional: Negative.    HENT: Negative.     Eyes: Negative.    Respiratory: Negative.     Cardiovascular: Negative.    Gastrointestinal: Negative.    Genitourinary:  Positive for difficulty urinating and testicular pain. Negative for penile discharge, penile pain, penile swelling and scrotal swelling.   Musculoskeletal: Negative.    Skin: Negative.    Neurological: Negative.    Psychiatric/Behavioral: Negative.     All other systems reviewed and are negative.                                 Objective    Vitals:    02/15/25 1641   Pulse: 63   Resp: 18   Temp: 37 °C (98.6 °F)   TempSrc: Oral   SpO2: 97%   Weight: 76.2 kg (168 lb) " "  Height: 1.753 m (5' 9\")     No LMP for male patient.    Physical Exam  Vitals and nursing note reviewed.   Constitutional:       General: He is not in acute distress.     Appearance: Normal appearance. He is not ill-appearing, toxic-appearing or diaphoretic.   HENT:      Head: Normocephalic and atraumatic.      Nose: Nose normal.      Mouth/Throat:      Mouth: Mucous membranes are moist.      Pharynx: Oropharynx is clear.   Eyes:      Extraocular Movements: Extraocular movements intact.      Pupils: Pupils are equal, round, and reactive to light.   Cardiovascular:      Rate and Rhythm: Normal rate and regular rhythm.      Pulses: Normal pulses.      Heart sounds: Normal heart sounds.   Pulmonary:      Effort: Pulmonary effort is normal. No respiratory distress.      Breath sounds: Normal breath sounds.   Abdominal:      General: Bowel sounds are normal.      Tenderness: There is no right CVA tenderness, left CVA tenderness, guarding or rebound.   Skin:     General: Skin is warm and dry.   Neurological:      Mental Status: He is alert and oriented to person, place, and time.   Psychiatric:         Mood and Affect: Mood normal.         Behavior: Behavior normal.         Procedures    Point of Care Test & Imaging Results from this visit  No results found for this visit on 02/15/25.   No results found.    Diagnostic study results (if any) were reviewed by DONNELL Youngblood.    Assessment/Plan   Allergies, medications, history, and pertinent labs/EKGs/Imaging reviewed by DONNELL Youngblood.     Medical Decision Making  Presentation consistent with prostatitis. Sending another course of cipro. See orders. Keep and have close follow up with PCP. ED for any new or worsening s/s. Discussed OTC symptoms management. Patient verbalized understanding and agreed with the plan of care.      At time of discharge, patient was clinically well-appearing and appropriate for outpatient management. The " patient/parent/guardian was educated regarding diagnosis, supportive care, OTC and Rx medications. The patient/parent/guardian was given the opportunity to ask questions prior to discharge. They verbalized understanding of discussion of treatment plan, expected course of illness and/or injury, indications on when to return to , when to seek further evaluation in ED/call 911, and the need to follow up with PCP and/or specialist as referred. Patient/parent/guardian was provided with work/school documentation if requested. Patient stable upon discharge.      Orders and Diagnoses  Diagnoses and all orders for this visit:  Acute prostatitis  -     ciprofloxacin (Cipro) 250 mg tablet; Take 2 tablets (500 mg) by mouth 2 times a day for 7 days.      Medical Admin Record      Patient disposition: Home    Electronically signed by DONNELL Youngblood  5:30 PM

## 2025-02-16 RX ORDER — CITALOPRAM 10 MG/1
10 TABLET ORAL DAILY
Qty: 90 TABLET | Refills: 1 | OUTPATIENT
Start: 2025-02-16

## 2025-02-16 NOTE — TELEPHONE ENCOUNTER
BRIEF NOTE    Subjective/Objective:  Pharmacy refill request for Celexa 10mg.     Assessment/Plan:  1. Generalized anxiety disorder  - I am seeing patient this week. We will uptitrate medication if needed at that time.     No problem-specific Assessment & Plan notes found for this encounter.        Carmen Godinez DO, Natividad  [facility]  Office: [phone]  2/16/2025 12:15 PM

## 2025-02-21 ENCOUNTER — TELEPHONE (OUTPATIENT)
Dept: PRIMARY CARE | Facility: CLINIC | Age: 60
End: 2025-02-21

## 2025-02-21 ENCOUNTER — LAB REQUISITION (OUTPATIENT)
Dept: LAB | Facility: HOSPITAL | Age: 60
End: 2025-02-21

## 2025-02-21 ENCOUNTER — APPOINTMENT (OUTPATIENT)
Dept: PRIMARY CARE | Facility: CLINIC | Age: 60
End: 2025-02-21
Payer: COMMERCIAL

## 2025-02-21 VITALS
TEMPERATURE: 97.3 F | DIASTOLIC BLOOD PRESSURE: 76 MMHG | BODY MASS INDEX: 23.81 KG/M2 | HEART RATE: 59 BPM | OXYGEN SATURATION: 97 % | WEIGHT: 161.2 LBS | SYSTOLIC BLOOD PRESSURE: 149 MMHG

## 2025-02-21 DIAGNOSIS — N45.1 EPIDIDYMITIS WITH NO ABSCESS: Primary | ICD-10-CM

## 2025-02-21 DIAGNOSIS — N45.1 EPIDIDYMITIS WITH NO ABSCESS: ICD-10-CM

## 2025-02-21 DIAGNOSIS — I10 PRIMARY HYPERTENSION: ICD-10-CM

## 2025-02-21 DIAGNOSIS — F41.1 GENERALIZED ANXIETY DISORDER: ICD-10-CM

## 2025-02-21 DIAGNOSIS — N41.0 ACUTE PROSTATITIS: ICD-10-CM

## 2025-02-21 DIAGNOSIS — N45.1 EPIDIDYMITIS: ICD-10-CM

## 2025-02-21 DIAGNOSIS — I10 PRIMARY HYPERTENSION: Primary | ICD-10-CM

## 2025-02-21 DIAGNOSIS — I10 ESSENTIAL (PRIMARY) HYPERTENSION: ICD-10-CM

## 2025-02-21 LAB
ALBUMIN SERPL BCP-MCNC: 5.6 G/DL (ref 3.4–5)
ALP SERPL-CCNC: 56 U/L (ref 33–120)
ALT SERPL W P-5'-P-CCNC: 30 U/L (ref 10–52)
ANION GAP SERPL CALC-SCNC: 13 MMOL/L (ref 10–20)
AST SERPL W P-5'-P-CCNC: 19 U/L (ref 9–39)
BASOPHILS # BLD AUTO: 0.05 X10*3/UL (ref 0–0.1)
BASOPHILS NFR BLD AUTO: 0.7 %
BILIRUB SERPL-MCNC: 1.5 MG/DL (ref 0–1.2)
BUN SERPL-MCNC: 19 MG/DL (ref 6–23)
CALCIUM SERPL-MCNC: 10.9 MG/DL (ref 8.6–10.6)
CHLORIDE SERPL-SCNC: 100 MMOL/L (ref 98–107)
CO2 SERPL-SCNC: 28 MMOL/L (ref 21–32)
CREAT SERPL-MCNC: 1.01 MG/DL (ref 0.5–1.3)
EGFRCR SERPLBLD CKD-EPI 2021: 86 ML/MIN/1.73M*2
EOSINOPHIL # BLD AUTO: 0.07 X10*3/UL (ref 0–0.7)
EOSINOPHIL NFR BLD AUTO: 1 %
ERYTHROCYTE [DISTWIDTH] IN BLOOD BY AUTOMATED COUNT: 13.3 % (ref 11.5–14.5)
GLUCOSE SERPL-MCNC: 104 MG/DL (ref 74–99)
HCT VFR BLD AUTO: 46.6 % (ref 41–52)
HGB BLD-MCNC: 15.9 G/DL (ref 13.5–17.5)
IMM GRANULOCYTES # BLD AUTO: 0.02 X10*3/UL (ref 0–0.7)
IMM GRANULOCYTES NFR BLD AUTO: 0.3 % (ref 0–0.9)
LYMPHOCYTES # BLD AUTO: 1.39 X10*3/UL (ref 1.2–4.8)
LYMPHOCYTES NFR BLD AUTO: 19.6 %
MCH RBC QN AUTO: 29.7 PG (ref 26–34)
MCHC RBC AUTO-ENTMCNC: 34.1 G/DL (ref 32–36)
MCV RBC AUTO: 87 FL (ref 80–100)
MONOCYTES # BLD AUTO: 0.71 X10*3/UL (ref 0.1–1)
MONOCYTES NFR BLD AUTO: 10 %
NEUTROPHILS # BLD AUTO: 4.86 X10*3/UL (ref 1.2–7.7)
NEUTROPHILS NFR BLD AUTO: 68.4 %
NRBC BLD-RTO: 0 /100 WBCS (ref 0–0)
PLATELET # BLD AUTO: 266 X10*3/UL (ref 150–450)
POTASSIUM SERPL-SCNC: 4.3 MMOL/L (ref 3.5–5.3)
PROT SERPL-MCNC: 7.6 G/DL (ref 6.4–8.2)
RBC # BLD AUTO: 5.36 X10*6/UL (ref 4.5–5.9)
SODIUM SERPL-SCNC: 137 MMOL/L (ref 136–145)
WBC # BLD AUTO: 7.1 X10*3/UL (ref 4.4–11.3)

## 2025-02-21 PROCEDURE — 99214 OFFICE O/P EST MOD 30 MIN: CPT | Performed by: STUDENT IN AN ORGANIZED HEALTH CARE EDUCATION/TRAINING PROGRAM

## 2025-02-21 PROCEDURE — 85025 COMPLETE CBC W/AUTO DIFF WBC: CPT

## 2025-02-21 PROCEDURE — 36415 COLL VENOUS BLD VENIPUNCTURE: CPT

## 2025-02-21 PROCEDURE — 80053 COMPREHEN METABOLIC PANEL: CPT

## 2025-02-21 PROCEDURE — 3077F SYST BP >= 140 MM HG: CPT | Performed by: STUDENT IN AN ORGANIZED HEALTH CARE EDUCATION/TRAINING PROGRAM

## 2025-02-21 PROCEDURE — 3078F DIAST BP <80 MM HG: CPT | Performed by: STUDENT IN AN ORGANIZED HEALTH CARE EDUCATION/TRAINING PROGRAM

## 2025-02-21 RX ORDER — SULFAMETHOXAZOLE AND TRIMETHOPRIM 800; 160 MG/1; MG/1
1 TABLET ORAL
COMMUNITY
Start: 2025-02-17

## 2025-02-21 RX ORDER — IBUPROFEN 400 MG/1
400 TABLET ORAL EVERY 6 HOURS PRN
COMMUNITY
End: 2025-02-21 | Stop reason: ALTCHOICE

## 2025-02-21 RX ORDER — TAMSULOSIN HYDROCHLORIDE 0.4 MG/1
1 CAPSULE ORAL
COMMUNITY
Start: 2025-02-01

## 2025-02-21 RX ORDER — CITALOPRAM 20 MG/1
20 TABLET, FILM COATED ORAL DAILY
Qty: 30 TABLET | Refills: 1 | Status: SHIPPED | OUTPATIENT
Start: 2025-02-21 | End: 2025-04-22

## 2025-02-21 ASSESSMENT — ANXIETY QUESTIONNAIRES
GAD7 TOTAL SCORE: 11
6. BECOMING EASILY ANNOYED OR IRRITABLE: NOT AT ALL
7. FEELING AFRAID AS IF SOMETHING AWFUL MIGHT HAPPEN: SEVERAL DAYS
1. FEELING NERVOUS, ANXIOUS, OR ON EDGE: MORE THAN HALF THE DAYS
4. TROUBLE RELAXING: MORE THAN HALF THE DAYS
3. WORRYING TOO MUCH ABOUT DIFFERENT THINGS: MORE THAN HALF THE DAYS
5. BEING SO RESTLESS THAT IT IS HARD TO SIT STILL: MORE THAN HALF THE DAYS
IF YOU CHECKED OFF ANY PROBLEMS ON THIS QUESTIONNAIRE, HOW DIFFICULT HAVE THESE PROBLEMS MADE IT FOR YOU TO DO YOUR WORK, TAKE CARE OF THINGS AT HOME, OR GET ALONG WITH OTHER PEOPLE: VERY DIFFICULT
2. NOT BEING ABLE TO STOP OR CONTROL WORRYING: MORE THAN HALF THE DAYS

## 2025-02-21 ASSESSMENT — PATIENT HEALTH QUESTIONNAIRE - PHQ9
10. IF YOU CHECKED OFF ANY PROBLEMS, HOW DIFFICULT HAVE THESE PROBLEMS MADE IT FOR YOU TO DO YOUR WORK, TAKE CARE OF THINGS AT HOME, OR GET ALONG WITH OTHER PEOPLE: VERY DIFFICULT
8. MOVING OR SPEAKING SO SLOWLY THAT OTHER PEOPLE COULD HAVE NOTICED. OR THE OPPOSITE, BEING SO FIGETY OR RESTLESS THAT YOU HAVE BEEN MOVING AROUND A LOT MORE THAN USUAL: NOT AT ALL
9. THOUGHTS THAT YOU WOULD BE BETTER OFF DEAD, OR OF HURTING YOURSELF: NOT AT ALL
5. POOR APPETITE OR OVEREATING: NOT AT ALL
4. FEELING TIRED OR HAVING LITTLE ENERGY: SEVERAL DAYS
SUM OF ALL RESPONSES TO PHQ QUESTIONS 1-9: 6
SUM OF ALL RESPONSES TO PHQ9 QUESTIONS 1 AND 2: 3
2. FEELING DOWN, DEPRESSED OR HOPELESS: SEVERAL DAYS
3. TROUBLE FALLING OR STAYING ASLEEP OR SLEEPING TOO MUCH: NOT AT ALL
1. LITTLE INTEREST OR PLEASURE IN DOING THINGS: MORE THAN HALF THE DAYS
6. FEELING BAD ABOUT YOURSELF - OR THAT YOU ARE A FAILURE OR HAVE LET YOURSELF OR YOUR FAMILY DOWN: NOT AT ALL
7. TROUBLE CONCENTRATING ON THINGS, SUCH AS READING THE NEWSPAPER OR WATCHING TELEVISION: MORE THAN HALF THE DAYS

## 2025-02-21 NOTE — TELEPHONE ENCOUNTER
BRIEF NOTE    Subjective/Objective:  Patient on Bactrim. Had episode at [facility] yesterday. I see he later called Urology today and unclear what his concerns were at that time, but they had him increase Flomax and add Mobic 15mg.     I would like patient to repeat labs in 1-2 weeks to ensure no RATNA developing.     Assessment/Plan:  1. Epididymitis with no abscess (Primary)  - CBC and Auto Differential; Future  - Comprehensive Metabolic Panel; Future  - CBC and Auto Differential  - Comprehensive Metabolic Panel    2. Primary hypertension  - CBC and Auto Differential; Future  - Comprehensive Metabolic Panel; Future  - CBC and Auto Differential  - Comprehensive Metabolic Panel    3. Acute prostatitis  - CBC and Auto Differential; Future  - Comprehensive Metabolic Panel; Future  - CBC and Auto Differential  - Comprehensive Metabolic Panel      No problem-specific Assessment & Plan notes found for this encounter.        Carmen Godinez DO, MSEd  [facility]  Office: [phone]  2/21/2025 5:21 PM

## 2025-02-21 NOTE — PROGRESS NOTES
FAMILY MEDICINE  OFFICE VISIT   Damion Williamson  [id number]  1965    PCP: Carmen Godinez DO     Chief Complaint:   Chief Complaint   Patient presents with    Follow-up     Pt presents for 3 month follow up scales- pt states that him [place] his [place] rec [place] and he states that his pulse was in the 160's an he sat down for a minute and it went away, this is the first time that this has happened to him before.    Flu Vaccine     Pt declines flu vaccine at this time.        SUBJECTIVE     Damion Williamson is a 59 y.o. English-speaking male with pertinent PMHx of SO, HTN, who presents to the clinic with complaints of anxiety.    Anxiety  - Patient Health Questionnaire-9 Score: 6  - SO-7 Total Score: 11 (2/21/2025  9:00 AM)  - Made trip to [place] and took the Atarax.   - Felt like Celexa was taking enough of the edge off to use other tools. Reframing, yoga, prayer, etc.   - Since prostatitis   - Crying spells are gone  - Focus at work has been bad    Prostatitis   - Flew the 27th and morning of 28th felt like sitting on egg.   - Gave him Cipro for 14 days. After 10 days, still not better.   - Went to   - Has been on Bactrim since Monday. On 14 day course.   - Going back to [facility] next month.   - Had HR that went up a little at the gym. Showed the curve with smart watch and jumped up significantly. Tingling lips and finger, and sweating.   - Just sat down had some deep breathing, then resolved  - Things are better than the Doxcycyline.   - Slept great last night.       Monday - Thursday night [place] trip        HPI      The following portions of the patient's chart were reviewed in this encounter and updated as appropriate:  Tobacco  Allergies  Meds  Problems  Med Hx  Surg Hx  Fam Hx         Home Medication List:  Current Outpatient Medications   Medication Instructions    ascorbic acid (VITAMIN C ORAL) Take by mouth.    aspirin 81 mg, Daily    cholecalciferol, vitamin D3, (VITAMIN  D3 ORAL) Take by mouth.    ciprofloxacin (CIPRO) 500 mg, oral, 2 times daily    citalopram (CELEXA) 10 mg, oral, Daily    docosahexaenoic acid/epa (FISH OIL ORAL) Take by mouth.    hydroCHLOROthiazide (MICROZIDE) 12.5 mg, oral, Daily    hydrOXYzine HCL (ATARAX) 10 mg, oral, 3 times daily, Take about 30min before getting on the plane.    ibuprofen 400 mg, Every 6 hours PRN    levothyroxine (SYNTHROID, LEVOXYL) 75 mcg, oral, Daily, as directed    magnesium 200 mg tablet Take by mouth.    multivitamin tablet 1 tablet, Daily    olmesartan (BENICAR) 10 mg, oral, Daily    rosuvastatin (Crestor) 10 mg tablet TAKE 1 TABLET DAILY    sulfamethoxazole-trimethoprim (Bactrim DS) 800-160 mg tablet 1 tablet, Every 12 hours scheduled (0630,1830)    tamsulosin (Flomax) 0.4 mg 24 hr capsule 1 capsule, Daily (0630)         OBJECTIVE   /76 (BP Location: Left arm, Patient Position: Sitting, BP Cuff Size: Adult)   Pulse 59   Temp 36.3 °C (97.3 °F) (Temporal)   Wt 73.1 kg (161 lb 3.2 oz)   SpO2 97%   BMI 23.81 kg/m²   Vital signs and pulse oximetry reviewed.     Physical Exam  Vitals and nursing note reviewed.   Constitutional:       Appearance: Normal appearance.   HENT:      Head: Normocephalic and atraumatic.      Right Ear: External ear normal.      Left Ear: External ear normal.      Nose: Nose normal. No congestion or rhinorrhea.   Eyes:      General: No scleral icterus.     Conjunctiva/sclera: Conjunctivae normal.   Cardiovascular:      Rate and Rhythm: Normal rate and regular rhythm.      Heart sounds: No murmur heard.  Pulmonary:      Effort: Pulmonary effort is normal. No respiratory distress.      Breath sounds: Normal breath sounds. No wheezing, rhonchi or rales.   Abdominal:      General: Abdomen is flat. Bowel sounds are normal. There is no distension.      Tenderness: There is abdominal tenderness in the suprapubic area. There is no right CVA tenderness, left CVA tenderness or guarding.      Hernia: No hernia is  present.   Musculoskeletal:      Right lower leg: No edema.      Left lower leg: No edema.   Skin:     General: Skin is warm.      Coloration: Skin is not jaundiced.   Neurological:      Mental Status: He is alert. Mental status is at baseline.   Psychiatric:         Attention and Perception: Attention normal.         Mood and Affect: Mood is anxious and depressed. Mood is not elated. Affect is not labile, blunt, flat, angry or tearful.         Behavior: Behavior normal. Behavior is cooperative.         Cognition and Memory: Cognition normal.         ASSESSMENT & PLAN     Problem List Items Addressed This Visit       Hypertension - Primary    Overview     Suspect component of white coat hypertension, home numbers normotensive via third validated cuff.    Current regimen:  HCTZ 12.5 mg daily  Olmesartan 20 mg daily, started 7/2023    Prior medications:   Lisinopril discontinued 7/2023 due to cough  Propranolol discontinued in the past due to bradycardia.         Current Assessment & Plan     Patient's blood pressure 149/76 today.  He has had large fluctuations in blood pressure over the years, which I do suspect is largely secondary to his anxiety.  He has been treated for his anxiety and his blood pressure has improved today.  -He is acutely ill today with prostatitis, so blood pressure may be elevated due to pain.  -At this time we will not make any adjustments to his blood pressure regimen, given his uncontrolled anxiety and acute infection.  -In the future would recommend stopping hydrochlorothiazide and going up on his olmesartan.  -Follow-up in 4wks.          Relevant Orders    Comprehensive Metabolic Panel    CBC and Auto Differential    Follow Up In [facility] - PCP - Established    Generalized anxiety disorder    Current Assessment & Plan     Patient with generalized anxiety, last appointment PHQ-9 11, SO 13.  He was started on Celexa at that time.  He has noticed a difference and feels like the  Celexa is taking the edge off things and he is no longer having crying spells.  Today his PHQ-9 6, SO 11.  -Will increase Celexa to 20 mg daily.  -Patient advised to continue with his guided meditation as well as seeking counselor.  -At his follow-up appointment we will repeat his SCALES.  Follow-up in 4wks.          Relevant Medications    citalopram (CeleXA) 20 mg tablet    Other Relevant Orders    Follow Up In [facility] - PCP - Established    Acute prostatitis    Current Assessment & Plan     Patient recently diagnosed with epididymitis and acute prostatitis.  He has been on a number of antibiotics, seems like things are getting better on the doxycycline, but then started to get worse.  He was started on Bactrim this week and is on a 14-day course.  -Continue to follow with urology for this.  -We will also get lab work: CBC, CMP, PSA, given his acute infection.  -We will keep his antibiotics the same right now, as he has close follow-up with his urology provider.         Relevant Orders    Comprehensive Metabolic Panel    CBC and Auto Differential    PSA, total and free (Completed)     Other Visit Diagnoses       Epididymitis with no abscess        Relevant Orders    Comprehensive Metabolic Panel    CBC and Auto Differential    PSA, total and free (Completed)            Level 4    Follow-Up Recommendations: 4wks HTN, SCALES    Please excuse any typos or grammatical errors, part of this note was constructed with Dragon dictation software.    Carmen Godinez DO, MSEd  [facility]   Office: [phone]  2/21/2025 4:36 PM

## 2025-02-24 LAB
PSA FREE MFR SERPL: 75 % (CALC)
PSA FREE SERPL-MCNC: 0.3 NG/ML
PSA SERPL-MCNC: 0.4 NG/ML

## 2025-02-28 ENCOUNTER — APPOINTMENT (OUTPATIENT)
Dept: PRIMARY CARE | Facility: CLINIC | Age: 60
End: 2025-02-28
Payer: COMMERCIAL

## 2025-03-05 PROBLEM — N41.0 ACUTE PROSTATITIS: Status: ACTIVE | Noted: 2025-03-05

## 2025-03-05 NOTE — ASSESSMENT & PLAN NOTE
Patient's blood pressure 149/76 today.  He has had large fluctuations in blood pressure over the years, which I do suspect is largely secondary to his anxiety.  He has been treated for his anxiety and his blood pressure has improved today.  -He is acutely ill today with prostatitis, so blood pressure may be elevated due to pain.  -At this time we will not make any adjustments to his blood pressure regimen, given his uncontrolled anxiety and acute infection.  -In the future would recommend stopping hydrochlorothiazide and going up on his olmesartan.  -Follow-up in 4wks.

## 2025-03-05 NOTE — ASSESSMENT & PLAN NOTE
Patient with generalized anxiety, last appointment PHQ-9 11, SO 13.  He was started on Celexa at that time.  He has noticed a difference and feels like the Celexa is taking the edge off things and he is no longer having crying spells.  Today his PHQ-9 6, SO 11.  -Will increase Celexa to 20 mg daily.  -Patient advised to continue with his guided meditation as well as seeking counselor.  -At his follow-up appointment we will repeat his SCALES.  Follow-up in 4wks.

## 2025-03-05 NOTE — ASSESSMENT & PLAN NOTE
Patient recently diagnosed with epididymitis and acute prostatitis.  He has been on a number of antibiotics, seems like things are getting better on the doxycycline, but then started to get worse.  He was started on Bactrim this week and is on a 14-day course.  -Continue to follow with urology for this.  -We will also get lab work: CBC, CMP, PSA, given his acute infection.  -We will keep his antibiotics the same right now, as he has close follow-up with his urology provider.

## 2025-03-25 ENCOUNTER — TELEPHONE (OUTPATIENT)
Dept: PRIMARY CARE | Facility: CLINIC | Age: 60
End: 2025-03-25

## 2025-03-25 NOTE — TELEPHONE ENCOUNTER
Calling patient to reschedule their 4/3 appt. And noticed that this is a 4 week follow-up where would should we put him? You have no available appts until end of June.    FYI: He has a physical scheduled for 8/4

## 2025-03-25 NOTE — TELEPHONE ENCOUNTER
Call back and offer pump time in May for follow-up or if he wants to follow-up in June that is okay if he feels like medication is going well.     Carmen Godinez DO, Natividad  [facility]  Office: [phone]  3/25/2025 5:11 PM     DC instructions

## 2025-04-03 ENCOUNTER — APPOINTMENT (OUTPATIENT)
Dept: PRIMARY CARE | Facility: CLINIC | Age: 60
End: 2025-04-03
Payer: COMMERCIAL

## 2025-04-08 ENCOUNTER — OFFICE VISIT (OUTPATIENT)
Dept: PRIMARY CARE | Facility: CLINIC | Age: 60
End: 2025-04-08
Payer: COMMERCIAL

## 2025-04-08 VITALS
HEART RATE: 67 BPM | DIASTOLIC BLOOD PRESSURE: 78 MMHG | BODY MASS INDEX: 23.91 KG/M2 | TEMPERATURE: 97.6 F | WEIGHT: 161.4 LBS | SYSTOLIC BLOOD PRESSURE: 134 MMHG | OXYGEN SATURATION: 96 % | RESPIRATION RATE: 14 BRPM | HEIGHT: 69 IN

## 2025-04-08 DIAGNOSIS — N41.0 ACUTE PROSTATITIS: ICD-10-CM

## 2025-04-08 DIAGNOSIS — N45.1 EPIDIDYMITIS WITH NO ABSCESS: Primary | ICD-10-CM

## 2025-04-08 DIAGNOSIS — R30.0 DYSURIA: ICD-10-CM

## 2025-04-08 PROCEDURE — 3078F DIAST BP <80 MM HG: CPT | Performed by: FAMILY MEDICINE

## 2025-04-08 PROCEDURE — 3008F BODY MASS INDEX DOCD: CPT | Performed by: FAMILY MEDICINE

## 2025-04-08 PROCEDURE — 3075F SYST BP GE 130 - 139MM HG: CPT | Performed by: FAMILY MEDICINE

## 2025-04-08 PROCEDURE — 99214 OFFICE O/P EST MOD 30 MIN: CPT | Performed by: FAMILY MEDICINE

## 2025-04-08 RX ORDER — OXYCODONE HYDROCHLORIDE 5 MG/1
TABLET ORAL
COMMUNITY
Start: 2025-03-14 | End: 2025-04-08 | Stop reason: WASHOUT

## 2025-04-08 RX ORDER — IBUPROFEN 600 MG/1
600 TABLET ORAL EVERY 6 HOURS PRN
COMMUNITY
Start: 2025-03-14

## 2025-04-08 RX ORDER — MELOXICAM 15 MG/1
1 TABLET ORAL
COMMUNITY
Start: 2025-02-21

## 2025-04-08 RX ORDER — CIPROFLOXACIN 500 MG/1
500 TABLET ORAL 2 TIMES DAILY
Qty: 30 TABLET | Refills: 0 | Status: SHIPPED | OUTPATIENT
Start: 2025-04-08 | End: 2025-04-18

## 2025-04-08 NOTE — PROGRESS NOTES
Subjective   Patient ID: Sami Williamson is a 59 y.o. male who presents for Nausea and Pain (Left testicle and prostate-worsening x approx 1 month/Left inguinal hernia surgery 03/14/2025-Spoke with surgeon at [facility] and was advised to see PCP to address pain/Prostititis 01/2025/Intermittent weak urination flow/Left flank pain (known kidney stones)).      HPI     The patient was diagnosed with prostatitis and was given antibiotics. He then went to urgent care who referred to urologist. The urologist then put him on doxycycline. The patient mentions no improvement. He then went to a nurse practitioner who gave medication which he could not tolerate due to itching. He was then put back on doxycycline. He ended up being diagnosed with a left inguinal hernia. Since then it has been 4 weeks and he is having persistent nausea and discomfort. He did take two ciprofloxacin 250mg of the leftover antibiotics from the initial medication given. It helped a bit. He does mentions some frequency and urgency.     The patient maintains they do not have any chest pain, chest tightness or shortness of breath.    They do not experience nausea, emesis, changes in bowel movements or dyspepsia.    The patient's vaccinations are up to date.    Review of Systems   Constitutional: Negative.    HENT: Negative.  Negative for dental problem and hearing loss.    Eyes: Negative.  Negative for visual disturbance.   Respiratory: Negative.  Negative for chest tightness and shortness of breath.    Cardiovascular: Negative.  Negative for chest pain.   Gastrointestinal: Negative.  Negative for constipation, diarrhea, nausea and vomiting.   Endocrine: Negative.    Genitourinary: Negative.    Musculoskeletal: Negative.    Skin: Negative.    Allergic/Immunologic: Negative.    Neurological: Negative.    Hematological: Negative.    Psychiatric/Behavioral: Negative.     14/14 systems reviewed and negative other than what is listed in the history of present illness  "    Objective   /78 (BP Location: Left arm, Patient Position: Sitting, BP Cuff Size: Adult)   Pulse 67   Temp 36.4 °C (97.6 °F) (Temporal)   Resp 14   Ht 1.753 m (5' 9\")   Wt 73.2 kg (161 lb 6.4 oz)   SpO2 96%   BMI 23.83 kg/m²     Physical Exam    Physical Exam  Constitutional:       Appearance: Normal appearance.   HENT:      Head: Normocephalic and atraumatic.      Nose: Nose normal.   Eyes:      Extraocular Movements: Extraocular movements intact.      Conjunctiva/sclera: Conjunctivae normal.      Pupils: Pupils are equal, round, and reactive to light.   Cardiovascular:      Rate and Rhythm: Normal rate and regular rhythm.      Pulses: Normal pulses.      Heart sounds: Normal heart sounds.   Pulmonary:      Effort: Pulmonary effort is normal.      Breath sounds: Normal breath sounds and air entry.   Abdominal:      General: Bowel sounds are normal.      Palpations: Abdomen is soft.   Genitourinary:     Epididymis:      Left: Inflamed. Tenderness present.   Musculoskeletal:         General: Normal range of motion.      Cervical back: Normal range of motion.   Neurological:      Mental Status: He is alert.   Psychiatric:         Mood and Affect: Mood normal.         Behavior: Behavior normal.         Thought Content: Thought content normal.         Judgment: Judgment normal.           Assessment/Plan     1. Epididymitis with no abscess  ciprofloxacin (Cipro) 500 mg tablet      2. Dysuria  Urinalysis with Reflex Culture and Microscopic    Urinalysis with Reflex Culture and Microscopic      3. Acute prostatitis  ciprofloxacin (Cipro) 500 mg tablet        1.  Prostatitis with epididymitis status post left inguinal hernia repair    For over a month now you have been treated with intermittent antibiotics Cipro doxycycline and Bactrim.  Unfortunately you had a reaction to the Bactrim.    You had a CT scan which indicated an inguinal hernia.  The inguinal hernia was repaired.    You have normal postsurgical " discomfort at that site.    You continue to have symptoms of prostatitis and epididymitis.    On exam today you have some mild tenderness in the epigastric area of the abdomen which could be related to some inflammation due to all of the ibuprofen and other medications you have taken postoperatively.    You also have a swollen left epididymis.  We elected not to do a prostate exam    I am recommending you start on Cipro 500 mg twice a day and I am asking that you send me a message next Monday or Tuesday with an update I would like you to stay on the Cipro until all discomfort has completely gone for 5 days.  If your symptoms start to worsen we will give you a referral to see one of the urologist at the [facility].    You did collect a urine specimen I would like to send that to be thorough making sure there is no blood in the urine    For the epigastric discomfort I am asking that you take Pepcid 20 mg over-the-counter twice a day.    Please also take a probiotic twice a day.    Certainly if it anytime you worsen you will call us I expect you will slowly improve continue to wear compression underwear.    I will hear back from you in about 1 week    Follow-up in 6 months or sooner if there are any concerns.    Scribe Attestation  By signing my name below, IKym Scribe   attest that this documentation has been prepared under the direction and in the presence of Simon Milian MD.    This note has been transcribed using a medical scribe and there is a possibility of unintentional typing misprints.

## 2025-04-08 NOTE — PATIENT INSTRUCTIONS
1.  Prostatitis with epididymitis status post left inguinal hernia repair    For over a month now you have been treated with intermittent antibiotics Cipro doxycycline and Bactrim.  Unfortunately you had a reaction to the Bactrim.    You had a CT scan which indicated an inguinal hernia.  The inguinal hernia was repaired.    You have normal postsurgical discomfort at that site.    You continue to have symptoms of prostatitis and epididymitis.    On exam today you have some mild tenderness in the epigastric area of the abdomen which could be related to some inflammation due to all of the ibuprofen and other medications you have taken postoperatively.    You also have a swollen left epididymis.  We elected not to do a prostate exam    I am recommending you start on Cipro 500 mg twice a day and I am asking that you send me a message next Monday or Tuesday with an update I would like you to stay on the Cipro until all discomfort has completely gone for 5 days.  If your symptoms start to worsen we will give you a referral to see one of the urologist at the [facility].    You did collect a urine specimen I would like to send that to be thorough making sure there is no blood in the urine    For the epigastric discomfort I am asking that you take Pepcid 20 mg over-the-counter twice a day.    Please also take a probiotic twice a day.    Certainly if it anytime you worsen you will call us I expect you will slowly improve continue to wear compression underwear.    I will hear back from you in about 1 week

## 2025-04-09 PROBLEM — N45.1 EPIDIDYMITIS WITH NO ABSCESS: Status: ACTIVE | Noted: 2025-04-09

## 2025-04-09 PROBLEM — R30.0 DYSURIA: Status: ACTIVE | Noted: 2025-04-09

## 2025-04-11 ENCOUNTER — APPOINTMENT (OUTPATIENT)
Dept: ENDOCRINOLOGY | Facility: CLINIC | Age: 60
End: 2025-04-11
Payer: COMMERCIAL

## 2025-04-11 VITALS
WEIGHT: 164 LBS | HEIGHT: 69 IN | DIASTOLIC BLOOD PRESSURE: 78 MMHG | SYSTOLIC BLOOD PRESSURE: 144 MMHG | BODY MASS INDEX: 24.29 KG/M2

## 2025-04-11 DIAGNOSIS — R79.89 LOW TESTOSTERONE: Primary | ICD-10-CM

## 2025-04-11 DIAGNOSIS — R63.4 UNEXPLAINED WEIGHT LOSS: ICD-10-CM

## 2025-04-11 DIAGNOSIS — R53.83 OTHER FATIGUE: ICD-10-CM

## 2025-04-11 DIAGNOSIS — E03.9 HYPOTHYROIDISM, UNSPECIFIED TYPE: ICD-10-CM

## 2025-04-11 LAB
BACTERIA UR CULT: NORMAL
COLOR UR: NORMAL

## 2025-04-11 PROCEDURE — 99204 OFFICE O/P NEW MOD 45 MIN: CPT | Performed by: INTERNAL MEDICINE

## 2025-04-11 PROCEDURE — 1036F TOBACCO NON-USER: CPT | Performed by: INTERNAL MEDICINE

## 2025-04-11 PROCEDURE — 3077F SYST BP >= 140 MM HG: CPT | Performed by: INTERNAL MEDICINE

## 2025-04-11 PROCEDURE — 3078F DIAST BP <80 MM HG: CPT | Performed by: INTERNAL MEDICINE

## 2025-04-11 PROCEDURE — 3008F BODY MASS INDEX DOCD: CPT | Performed by: INTERNAL MEDICINE

## 2025-04-11 NOTE — PROGRESS NOTES
"Patient ID: Damion Williamson \"Sami\" is a 59 y.o. male who presents for New Patient Visit (Endocrine consult. Referred by Dr.Van Akhtar for low testosterone and loss of weight.).  HPI  The patient is referred for evaluation of hypogonadism.    This is a 59-year-old gentleman who had previously years ago being on testosterone gel replacement.    He indicates that he had been told his pituitary was underfunctioning.    In 2019 he had a total testosterone of 273 in 2020 and it was 299.    In November his total testosterone was 227 but his free level was 49.5.    He has noted a slight decrease in libido and erectile function but has also had issues with epididymitis and prostatitis.    He has had some unintentional weight loss which he believes is related to anxiety and is now on treatment which has been improving things but he still thinks he needs further adjustment.    He had a pituitary evaluation that was essentially normal.    He has also been positive for mono.    He has a past history of hypertension hyperlipidemia hypothyroidism adequately replaced hernia surgery impaired fasting glucose.    Socially he is  works as an  for [employer] non-smoker drinks alcohol on occasion.    Family history positive for diabetes in both parents.    ROS  Comprehensive review of systems is negative.    Objective   Physical Exam  Visit Vitals  /78      Vitals:    04/11/25 1023   Weight: 74.4 kg (164 lb)      Body mass index is 24.22 kg/m².      Alert and oriented x3  In no distress  No focal neurologic deficits  No supraclavicular, or dorsal fat  No purple striae  Integument intact  Eyes normal  ENT normal. No adenopathy  Thyroid palpable and normal. No nodules  Chest clear to auscultation  Heart sounds are normal  Abdomen nontender. Bowel sounds normal. No organomegaly  Feet are okay  Reflexes normal with normal return    Current Outpatient Medications   Medication Sig Dispense Refill    ascorbic acid " (VITAMIN C ORAL) Take by mouth.      aspirin 81 mg EC tablet Take 1 tablet (81 mg) by mouth once daily.      ciprofloxacin (Cipro) 500 mg tablet Take 1 tablet (500 mg) by mouth 2 times a day for 10 days. 30 tablet 0    citalopram (CeleXA) 20 mg tablet Take 1 tablet (20 mg) by mouth once daily. 30 tablet 1    hydroCHLOROthiazide (Microzide) 12.5 mg capsule TAKE 1 CAPSULE DAILY 90 capsule 3    ibuprofen 600 mg tablet Take 1 tablet (600 mg) by mouth every 6 hours if needed.      levothyroxine (Synthroid, Levoxyl) 75 mcg tablet Take 1 tablet (75 mcg) by mouth once daily. as directed 90 tablet 3    meloxicam (Mobic) 15 mg tablet Take 1 tablet (15 mg) by mouth early in the morning..      olmesartan (BENIcar) 20 mg tablet Take 0.5 tablets (10 mg) by mouth once daily. 45 tablet 3    tamsulosin (Flomax) 0.4 mg 24 hr capsule Take 1 capsule (0.4 mg) by mouth early in the morning..      hydrOXYzine HCL (Atarax) 10 mg tablet Take 1 tablet (10 mg) by mouth 3 times a day. Take about 30min before getting on the plane. 30 tablet 0    rosuvastatin (Crestor) 10 mg tablet TAKE 1 TABLET DAILY 90 tablet 3     No current facility-administered medications for this visit.       Assessment/Plan     1.  Low total but normal free testosterone  2.  Hypothyroidism adequately replaced  3.  Impaired fasting glucose  4.  Hypertension  5.  Hyperlipidemia  6.  Weight loss  7.  Anxiety    Reviewed his blood work.    We discussed that the free testosterone is what his body actually reacts to and since it is normal while his total was low this is because sex hormone binding globulin is low and is not clinically significant.    We discussed that there is no benefit to testosterone replacement.    We discussed his other hormone levels.    We discussed his thyroid.    We discussed his blood sugar.    At present I have advised that he needs no further evaluation or treatment.    He will follow-up with me if needed.

## 2025-04-14 ENCOUNTER — TELEPHONE (OUTPATIENT)
Dept: PRIMARY CARE | Facility: CLINIC | Age: 60
End: 2025-04-14

## 2025-04-14 NOTE — TELEPHONE ENCOUNTER
Information from [facility] rcvd. UA, Comp w..=/RFL Cult CLR not performed due to improper specimen submitted.

## 2025-04-28 DIAGNOSIS — F41.1 GENERALIZED ANXIETY DISORDER: ICD-10-CM

## 2025-04-28 RX ORDER — CITALOPRAM 20 MG/1
20 TABLET, FILM COATED ORAL DAILY
Qty: 30 TABLET | Refills: 11 | Status: SHIPPED | OUTPATIENT
Start: 2025-04-28

## 2025-05-05 ENCOUNTER — OFFICE VISIT (OUTPATIENT)
Dept: PRIMARY CARE | Facility: CLINIC | Age: 60
End: 2025-05-05
Payer: COMMERCIAL

## 2025-05-05 VITALS
TEMPERATURE: 97.8 F | HEART RATE: 66 BPM | DIASTOLIC BLOOD PRESSURE: 69 MMHG | BODY MASS INDEX: 24.25 KG/M2 | WEIGHT: 164.2 LBS | OXYGEN SATURATION: 97 % | SYSTOLIC BLOOD PRESSURE: 126 MMHG

## 2025-05-05 DIAGNOSIS — N50.82 PAINFUL SCROTUM: ICD-10-CM

## 2025-05-05 DIAGNOSIS — N45.1 EPIDIDYMITIS WITH NO ABSCESS: ICD-10-CM

## 2025-05-05 DIAGNOSIS — N45.1 CHRONIC EPIDIDYMITIS: Primary | ICD-10-CM

## 2025-05-05 PROCEDURE — 99213 OFFICE O/P EST LOW 20 MIN: CPT | Performed by: FAMILY MEDICINE

## 2025-05-05 PROCEDURE — 3074F SYST BP LT 130 MM HG: CPT | Performed by: FAMILY MEDICINE

## 2025-05-05 PROCEDURE — 3078F DIAST BP <80 MM HG: CPT | Performed by: FAMILY MEDICINE

## 2025-05-05 NOTE — PATIENT INSTRUCTIONS
VISIT SUMMARY:  During your visit, we discussed your persistent left testicular discomfort following your hernia surgery. We reviewed your symptoms, including the discomfort during physical activity and the history of prostatitis and epididymitis. We also discussed your anxiety and the side effects of ciprofloxacin.    YOUR PLAN:  -EPIDIDYMITIS: Epididymitis is an inflammation of the epididymis, which can cause testicular discomfort. We recommend you see urologist Dr. Barragan for further evaluation and management. Continue wearing an athletic supporter for comfort, use acetaminophen for pain as needed, and stop physical activities if the discomfort increases.    -VARICOCELE: A varicocele is an enlargement of the veins within the scrotum, which can cause discomfort. We recommend you see urologist Dr. Barragan for further evaluation and management.    -HYDROCELE: A hydrocele is a fluid-filled sac around a testicle, which can cause discomfort. We recommend you see urologist Dr. Barragan for further evaluation and management.    -ANXIETY: Anxiety can be worsened by certain medications. We discussed that ciprofloxacin might be contributing to your anxiety, so you should discontinue its use to prevent further exacerbation.      -HERNIA REPAIR: You had a hernia repair surgery, and there are no signs of recurrence. However, you may have discomfort near the surgical site, possibly due to nerve damage from the surgery.    INSTRUCTIONS:  Please follow up with urologist Dr. Barragan for further evaluation and management of your testicular discomfort. Continue wearing an athletic supporter, use acetaminophen for pain as needed, and avoid physical activities that increase your discomfort. Discontinue ciprofloxacin to prevent further anxiety exacerbation.

## 2025-05-05 NOTE — PROGRESS NOTES
"Subjective   Patient ID: Damion Schaffer" is a 59 y.o. male who presents for Sick Visit.  History of Present Illness  Damion Schaffer" is a 59 year old male who presents with persistent left testicular discomfort.    He underwent hernia surgery on March 14, 2025, and has experienced persistent discomfort in the left testicle since then. The discomfort is described as 'just an uncomfortableness' and worsens with physical activity, such as crawling on the ground or walking long distances. He continues to wear an athletic supporter for relief.    He was previously prescribed ciprofloxacin, which he took until he ran out on April 8, 2025. The discomfort has decreased since his last visit on April 8, 2025, but it remains present. No blood in urine and no sexual activity since January 2025 due to discomfort.    He has a history of prostatitis and epididymitis, which were present before the hernia surgery. A CT scan prior to surgery revealed a hernia, and he underwent surgery to address it. Despite the surgery, he continues to experience discomfort primarily in the left testicle and scrotum area.    Physical activities such as walking, lifting, and moving aggravate the discomfort, while coughing does not. No fever or redness in the area. He has not been intimate since January 2025 and has had a vasectomy in the past.    He has been taking Celexa for anxiety and was previously taking ibuprofen 600 mg, which he discontinued due to gastrointestinal discomfort. He continues to take probiotics and Pepcid.    Review of Systems    Objective     /69 (BP Location: Left arm, Patient Position: Sitting, BP Cuff Size: Adult)   Pulse 66   Temp 36.6 °C (97.8 °F) (Temporal)   Wt 74.5 kg (164 lb 3.2 oz)   SpO2 97%   BMI 24.25 kg/m²      Physical Exam  GENERAL: Alert, cooperative, well developed, no acute distress  HEENT: Normocephalic, normal oropharynx, moist mucous membranes  CHEST: Clear to auscultation " bilaterally, no wheezes, rhonchi, or crackles  CARDIOVASCULAR: Normal heart rate and rhythm, S1 and S2 normal without murmurs  ABDOMEN: Soft, non-tender, non-distended, without organomegaly, normal bowel sounds, no costovertebral angle tenderness  GENITOURINARY: Tenderness over left scrotal bump, left scrotum less swollen than right, no hernia detected on examination  EXTREMITIES: No cyanosis or edema  NEUROLOGICAL: Cranial nerves grossly intact, moves all extremities without gross motor or sensory deficit    Assessment & Plan  Epididymitis  Persistent left testicular discomfort despite prolonged antibiotic treatment. Symptoms include discomfort during physical activity and urination. Differential diagnosis includes varicocele and hydrocele. Physical examination reveals a swollen epididymis, possibly related to the vas deferens post-vasectomy. Concerns about nerve damage from hernia surgery were discussed.  - Refer to urologist Dr. Barragan for further evaluation and management.  - Advise wearing an athletic supporter for comfort.  - Recommend using acetaminophen for pain management as needed.  - Advise to stop physical activities if discomfort increases.    Varicocele  Possible varicocele contributing to left testicular discomfort. Previous imaging identified a varicocele. Concerns about whether the discomfort is due to varicocele or nerve issues from hernia surgery were discussed.  - Refer to urologist Dr. Barragan for further evaluation and management.    Hydrocele  Hydrocele identified on previous imaging, potentially contributing to left testicular discomfort.  - Refer to urologist Dr. Barragan for further evaluation and management.    Anxiety  Anxiety potentially exacerbated by prolonged use of ciprofloxacin. Reports feeling not himself, which may be related to ciprofloxacin absorption in the brain. Concerns about ciprofloxacin side effects, including C. diff and tendon issues, were discussed.  - Discontinue  ciprofloxacin to prevent further anxiety exacerbation.    Prostatitis  Previous symptoms of prostate pain with no current indication of active prostatitis.    Hernia repair  Post-hernia repair with no signs of recurrence. Reports discomfort near the surgical site, but examination shows no hernia. Concerns about nerve damage from surgery contributing to pain were discussed.    1. Chronic epididymitis  Referral to Urology      2. Epididymitis with no abscess  Referral to Urology      3. Painful scrotum           Simon Milian MD     This medical note was created with the assistance of artificial intelligence (AI) for documentation purposes. The content has been reviewed and confirmed by the healthcare provider for accuracy and completeness. Patient consented to the use of audio recording and use of AI during their visit.

## 2025-05-06 ENCOUNTER — OFFICE VISIT (OUTPATIENT)
Dept: UROLOGY | Facility: CLINIC | Age: 60
End: 2025-05-06
Payer: COMMERCIAL

## 2025-05-06 VITALS — TEMPERATURE: 97.1 F

## 2025-05-06 DIAGNOSIS — N50.819 PAIN IN TESTICLE, UNSPECIFIED LATERALITY: ICD-10-CM

## 2025-05-06 DIAGNOSIS — I86.1 LEFT VARICOCELE: ICD-10-CM

## 2025-05-06 DIAGNOSIS — N45.1 EPIDIDYMITIS WITH NO ABSCESS: ICD-10-CM

## 2025-05-06 DIAGNOSIS — N45.1 CHRONIC EPIDIDYMITIS: ICD-10-CM

## 2025-05-06 PROCEDURE — 1036F TOBACCO NON-USER: CPT | Performed by: UROLOGY

## 2025-05-06 PROCEDURE — 99204 OFFICE O/P NEW MOD 45 MIN: CPT | Performed by: UROLOGY

## 2025-05-06 ASSESSMENT — PAIN SCALES - GENERAL: PAINLEVEL_OUTOF10: 0-NO PAIN

## 2025-05-06 NOTE — PROGRESS NOTES
"NPV here for evaluation of epididymitis and left scrotal pain, referred by Dr Simon Milian    Today's visit:    HPI:  59 y.o. yo male patient complains of left     #Scrotal pain   How long has this been going on- a couple months -started after hernia repair in March   which side/ or both- left   where is pain located- left testicle   any noticeable swelling- not on testicle , but on epididymis per patient  what was tried to relieve pain-  Cipro, ice. Discomfort worsens with physical activity   History of UTI/ STD exposure- no  History of vasectomy- yes  Hernia repair, left sided in March, 2025  No f/c/n/v  No hematuria   Was on 6 weeks of cipro because was told he has prostatitis   Lab Results   Component Value Date    PSA 0.4 02/21/2025     No components found for: \"CBC\"  No results found for: \"HGBA1C\"    No results found for the last 90 days.    Scrotal US, 3/2/25,   IMPRESSION:   1. Normal sonographic appearance of the testicles with preserved blood   flow.   2. Small left varicocele.     Scrotal US, 2/3/25,  IMPRESSION:   1. Small right-sided scrotal hydrocele.   2.  Small right epididymal cyst.   3.  Otherwise, normal study.   PMH:  Medical History[1]     PSH:  Surgical History[2]     Medications:  Current Medications[3]    Allergy:  Allergies[4]     Exam  Testicles descended bilaterally, nontender, no masses  Vasa palpable bilaterally  Varicocele: No (no clinical hydrocele or varicocele)   Penis circ'd, no lesions, no plaques  Left sided hernia incision healing well       Assessment/Plan  #scrotal pain -   Discussed scrotal pain and its different etiologies, including epididymitis, cancer, orchitis, etc.  Discussed workup for epididymo-orchitis could include US, urine culture, and GC/CT  Discussed that even if he has a varicocele, there is no guarantee that this pain is from varicocele and that varicocele repair will alleviate the pain  Discussed cord block and microsurgical cord denervation in detail, " including risks benefits and alternatives  Discussed conservative measures like nsaids, elevation, ice, tight underwear etc.  Also discussed pelvic floor PT    Patient elects   UA/CX/GC/CT/PVR/ureaplasma/mycoplasma   Pelvic floor PT    Scribe Attestation  By signing my name below, Kourtney MOTT , Scribe   attest that this documentation has been prepared under the direction and in the presence of Laurel Buchanan MD.             [1]   Past Medical History:  Diagnosis Date    Encounter for screening for malignant neoplasm of prostate     Screening PSA (prostate specific antigen)    Localized swelling, mass and lump, unspecified 10/05/2017    Nodule, subcutaneous   [2]   Past Surgical History:  Procedure Laterality Date    TONSILLECTOMY  02/11/2014    Tonsillectomy With Adenoidectomy   [3]   Current Outpatient Medications:     ascorbic acid (VITAMIN C ORAL), Take by mouth. (Patient not taking: Reported on 5/5/2025), Disp: , Rfl:     aspirin 81 mg EC tablet, Take 1 tablet (81 mg) by mouth once daily. (Patient not taking: Reported on 5/5/2025), Disp: , Rfl:     citalopram (CeleXA) 20 mg tablet, TAKE 1 TABLET ONCE DAILY, Disp: 30 tablet, Rfl: 11    hydroCHLOROthiazide (Microzide) 12.5 mg capsule, TAKE 1 CAPSULE DAILY, Disp: 90 capsule, Rfl: 3    hydrOXYzine HCL (Atarax) 10 mg tablet, Take 1 tablet (10 mg) by mouth 3 times a day. Take about 30min before getting on the plane., Disp: 30 tablet, Rfl: 0    levothyroxine (Synthroid, Levoxyl) 75 mcg tablet, Take 1 tablet (75 mcg) by mouth once daily. as directed, Disp: 90 tablet, Rfl: 3    olmesartan (BENIcar) 20 mg tablet, Take 0.5 tablets (10 mg) by mouth once daily., Disp: 45 tablet, Rfl: 3    rosuvastatin (Crestor) 10 mg tablet, TAKE 1 TABLET DAILY, Disp: 90 tablet, Rfl: 3  [4]   Allergies  Allergen Reactions    Pravastatin Other    Sulfamethoxazole-Trimethoprim Hives

## 2025-05-09 PROBLEM — N50.82: Status: ACTIVE | Noted: 2025-05-09

## 2025-05-09 LAB
BACTERIA #/AREA URNS HPF: NORMAL /HPF
BACTERIA UR CULT: NORMAL
C TRACH RRNA SPEC QL NAA+PROBE: NOT DETECTED
HYALINE CASTS #/AREA URNS LPF: NORMAL /LPF
M HOMINIS SPEC QL CULT: NORMAL
N GONORRHOEA RRNA SPEC QL NAA+PROBE: NOT DETECTED
QUEST GC CT AMPLIFIED (ALWAYS MESSAGE): NORMAL
RBC #/AREA URNS HPF: NORMAL /HPF
SERVICE CMNT-IMP: NORMAL
SQUAMOUS #/AREA URNS HPF: NORMAL /HPF
WBC #/AREA URNS HPF: NORMAL /HPF

## 2025-05-19 ENCOUNTER — APPOINTMENT (OUTPATIENT)
Dept: PRIMARY CARE | Facility: CLINIC | Age: 60
End: 2025-05-19
Payer: COMMERCIAL

## 2025-05-19 VITALS
OXYGEN SATURATION: 98 % | SYSTOLIC BLOOD PRESSURE: 136 MMHG | DIASTOLIC BLOOD PRESSURE: 70 MMHG | RESPIRATION RATE: 14 BRPM | TEMPERATURE: 98 F | WEIGHT: 170.4 LBS | HEIGHT: 69 IN | BODY MASS INDEX: 25.24 KG/M2

## 2025-05-19 DIAGNOSIS — I10 HYPERTENSION, UNSPECIFIED TYPE: ICD-10-CM

## 2025-05-19 DIAGNOSIS — E03.8 OTHER SPECIFIED HYPOTHYROIDISM: ICD-10-CM

## 2025-05-19 DIAGNOSIS — I10 PRIMARY HYPERTENSION: ICD-10-CM

## 2025-05-19 DIAGNOSIS — E78.49 OTHER HYPERLIPIDEMIA: ICD-10-CM

## 2025-05-19 DIAGNOSIS — F41.1 GENERALIZED ANXIETY DISORDER: ICD-10-CM

## 2025-05-19 PROCEDURE — 3008F BODY MASS INDEX DOCD: CPT | Performed by: STUDENT IN AN ORGANIZED HEALTH CARE EDUCATION/TRAINING PROGRAM

## 2025-05-19 PROCEDURE — 1036F TOBACCO NON-USER: CPT | Performed by: STUDENT IN AN ORGANIZED HEALTH CARE EDUCATION/TRAINING PROGRAM

## 2025-05-19 PROCEDURE — 99214 OFFICE O/P EST MOD 30 MIN: CPT | Performed by: STUDENT IN AN ORGANIZED HEALTH CARE EDUCATION/TRAINING PROGRAM

## 2025-05-19 PROCEDURE — 3078F DIAST BP <80 MM HG: CPT | Performed by: STUDENT IN AN ORGANIZED HEALTH CARE EDUCATION/TRAINING PROGRAM

## 2025-05-19 PROCEDURE — 3075F SYST BP GE 130 - 139MM HG: CPT | Performed by: STUDENT IN AN ORGANIZED HEALTH CARE EDUCATION/TRAINING PROGRAM

## 2025-05-19 RX ORDER — CITALOPRAM 20 MG/1
20 TABLET ORAL DAILY
Qty: 90 TABLET | Refills: 3 | Status: SHIPPED | OUTPATIENT
Start: 2025-05-19

## 2025-05-19 RX ORDER — LEVOTHYROXINE SODIUM 75 UG/1
75 TABLET ORAL DAILY
Qty: 90 TABLET | Refills: 3 | Status: SHIPPED | OUTPATIENT
Start: 2025-05-19

## 2025-05-19 RX ORDER — ROSUVASTATIN CALCIUM 10 MG/1
10 TABLET, COATED ORAL DAILY
Qty: 90 TABLET | Refills: 3 | Status: SHIPPED | OUTPATIENT
Start: 2025-05-19

## 2025-05-19 RX ORDER — OLMESARTAN MEDOXOMIL 20 MG/1
10 TABLET ORAL DAILY
Qty: 45 TABLET | Refills: 3 | Status: SHIPPED | OUTPATIENT
Start: 2025-05-19

## 2025-05-19 RX ORDER — HYDROCHLOROTHIAZIDE 12.5 MG/1
12.5 CAPSULE ORAL DAILY
Qty: 90 CAPSULE | Refills: 3 | Status: SHIPPED | OUTPATIENT
Start: 2025-05-19

## 2025-05-19 RX ORDER — TAMSULOSIN HYDROCHLORIDE 0.4 MG/1
CAPSULE ORAL
COMMUNITY
Start: 2025-05-18 | End: 2025-05-19 | Stop reason: ALTCHOICE

## 2025-05-19 ASSESSMENT — ANXIETY QUESTIONNAIRES
4. TROUBLE RELAXING: SEVERAL DAYS
2. NOT BEING ABLE TO STOP OR CONTROL WORRYING: NOT AT ALL
7. FEELING AFRAID AS IF SOMETHING AWFUL MIGHT HAPPEN: NOT AT ALL
5. BEING SO RESTLESS THAT IT IS HARD TO SIT STILL: SEVERAL DAYS
1. FEELING NERVOUS, ANXIOUS, OR ON EDGE: SEVERAL DAYS
GAD7 TOTAL SCORE: 3
6. BECOMING EASILY ANNOYED OR IRRITABLE: NOT AT ALL
3. WORRYING TOO MUCH ABOUT DIFFERENT THINGS: NOT AT ALL
IF YOU CHECKED OFF ANY PROBLEMS ON THIS QUESTIONNAIRE, HOW DIFFICULT HAVE THESE PROBLEMS MADE IT FOR YOU TO DO YOUR WORK, TAKE CARE OF THINGS AT HOME, OR GET ALONG WITH OTHER PEOPLE: SOMEWHAT DIFFICULT

## 2025-05-19 ASSESSMENT — PATIENT HEALTH QUESTIONNAIRE - PHQ9
1. LITTLE INTEREST OR PLEASURE IN DOING THINGS: NOT AT ALL
8. MOVING OR SPEAKING SO SLOWLY THAT OTHER PEOPLE COULD HAVE NOTICED. OR THE OPPOSITE, BEING SO FIGETY OR RESTLESS THAT YOU HAVE BEEN MOVING AROUND A LOT MORE THAN USUAL: NOT AT ALL
9. THOUGHTS THAT YOU WOULD BE BETTER OFF DEAD, OR OF HURTING YOURSELF: NOT AT ALL
2. FEELING DOWN, DEPRESSED OR HOPELESS: NOT AT ALL
5. POOR APPETITE OR OVEREATING: NOT AT ALL
SUM OF ALL RESPONSES TO PHQ QUESTIONS 1-9: 1
SUM OF ALL RESPONSES TO PHQ9 QUESTIONS 1 AND 2: 0
4. FEELING TIRED OR HAVING LITTLE ENERGY: SEVERAL DAYS
7. TROUBLE CONCENTRATING ON THINGS, SUCH AS READING THE NEWSPAPER OR WATCHING TELEVISION: NOT AT ALL
6. FEELING BAD ABOUT YOURSELF - OR THAT YOU ARE A FAILURE OR HAVE LET YOURSELF OR YOUR FAMILY DOWN: NOT AT ALL
3. TROUBLE FALLING OR STAYING ASLEEP OR SLEEPING TOO MUCH: NOT AT ALL

## 2025-05-19 NOTE — PATIENT INSTRUCTIONS
VISIT SUMMARY:  Today, we discussed your mental health, post-surgical recovery, and other health concerns. Your anxiety symptoms have improved with Celexa, and your mood has lifted with the change in weather and increased social activities. You are recovering well from hernia surgery and finding physical therapy beneficial for pelvic floor issues. We also addressed your kidney stone and the 'swoosh' sound in your right ear.    YOUR PLAN:  -GENERALIZED ANXIETY DISORDER (SO): Generalized Anxiety Disorder is a condition characterized by excessive worry about various aspects of life. Your symptoms are improving with Celexa, but we may need to adjust the dosage during the winter. Continue taking Celexa 20 mg daily, and consider increasing to 30 mg if symptoms worsen. Keep engaging in therapy and social activities.    -KIDNEY STONE: A kidney stone is a hard deposit made of minerals and salts that form inside your kidneys. You have a 6-8 mm stone in your left kidney and are scheduled to see a urologist for further evaluation. Please attend your urology appointment for further management.    -POST-HERNIA SURGERY RECOVERY: You are recovering from hernia surgery but experienced some discomfort after lifting heavy objects. Continue with physical therapy and avoid heavy lifting to aid your recovery.    -PELVIC FLOOR DYSFUNCTION: Pelvic floor dysfunction involves the inability to correctly relax and coordinate your pelvic floor muscles. You are seeing improvement with physical therapy, so please continue with your sessions.    -FLUID IN EAR: Fluid in the ear, likely due to eustachian tube dysfunction, can cause a 'swoosh' sound. Use Flonase nasal spray, two sprays in each nostril at night, and try ear drainage techniques in a hot, steamy shower.    INSTRUCTIONS:  Please follow up with your urologist for the kidney stone evaluation. Continue with your current medications and therapies, and we can consider adjusting your Celexa  dosage if your anxiety symptoms worsen during the winter. Avoid heavy lifting to aid your post-surgical recovery.

## 2025-05-19 NOTE — PROGRESS NOTES
"FAMILY MEDICINE  OFFICE VISIT   Damion Williamson  [id number]  1965    PCP: Carmen Godinez DO     Chief Complaint:   Chief Complaint   Patient presents with    Follow-up     FUV anxiety     SUBJECTIVE     Damion Williamson is a 59 y.o. English-speaking male, who presents to the clinic with complaints of follow-up.    Saw Dr. Milian recently.  PHQ 1  SO 3     History of Present Illness  Damion Williamson \"Bill\" is a 59 year old male who presents for follow-up on his mental health and post-surgical recovery.    He has been experiencing improved anxiety symptoms with the use of Celexa, although he feels that the dosage might need adjustment depending on the season. His mood has improved with the change in weather, and he has been engaging in more social activities, such as attending a men's Bible study and dining out with friends. He has completed seven sessions of counseling with Altaf Woodruff at "[facility] [facility] [facility].    He underwent hernia surgery and reports feeling well post-operatively, although he experienced some discomfort after helping his in-laws move. He is currently undergoing physical therapy for pelvic floor issues, which he initially doubted but now finds beneficial. He has been attending therapy sessions at a wellness center and reports improvement in his symptoms.    He has a history of a kidney stone in his left kidney, measuring 6-8 mm, and is scheduled to see a urologist for further evaluation. He is uncertain about the potential need for lithotripsy or a stent.    He experiences a 'swoosh' sound in his right ear when lying on his side, which has been ongoing for three months. He has not been using any nasal sprays or treatments for this issue.    His weight has fluctuated over the past year, with a decrease from 176 pounds to 154 pounds, and a recent slight increase. He attributes this to post-surgical recovery and possibly feeling better overall.    Saw Altaf Woodruff at "[facility] [facility] -- " "did 7 sessions with him and they released him.     HPI      The following portions of the patient's chart were reviewed in this encounter and updated as appropriate:  Tobacco  Allergies  Meds  Problems  Med Hx  Surg Hx  Fam Hx         Home Medication List:  Current Outpatient Medications   Medication Instructions    citalopram (CELEXA) 20 mg, oral, Daily    hydroCHLOROthiazide (MICROZIDE) 12.5 mg, oral, Daily    levothyroxine (SYNTHROID, LEVOXYL) 75 mcg, oral, Daily    olmesartan (BENICAR) 10 mg, oral, Daily    rosuvastatin (CRESTOR) 10 mg, oral, Daily         OBJECTIVE   /70 (BP Location: Right arm, Patient Position: Sitting, BP Cuff Size: Adult)   Temp 36.7 °C (98 °F) (Temporal)   Resp 14   Ht 1.753 m (5' 9\")   Wt 77.3 kg (170 lb 6.4 oz)   SpO2 98%   BMI 25.16 kg/m²   Vital signs and pulse oximetry reviewed.     Physical Exam  Vitals and nursing note reviewed.   Constitutional:       Appearance: Normal appearance.   HENT:      Head: Normocephalic and atraumatic.      Right Ear: Tympanic membrane, ear canal and external ear normal.      Left Ear: Tympanic membrane, ear canal and external ear normal.      Nose: Nose normal. No congestion or rhinorrhea.   Eyes:      General: No scleral icterus.     Conjunctiva/sclera: Conjunctivae normal.   Cardiovascular:      Rate and Rhythm: Normal rate and regular rhythm.      Heart sounds: No murmur heard.  Pulmonary:      Effort: Pulmonary effort is normal. No respiratory distress.      Breath sounds: Normal breath sounds. No wheezing, rhonchi or rales.   Musculoskeletal:      Cervical back: No rigidity or tenderness.      Right lower leg: No edema.      Left lower leg: No edema.   Lymphadenopathy:      Cervical: No cervical adenopathy.   Skin:     General: Skin is warm.      Coloration: Skin is not jaundiced.   Neurological:      Mental Status: He is alert. Mental status is at baseline.   Psychiatric:         Mood and Affect: Mood normal. Mood is not anxious " or depressed. Affect is not labile, flat or tearful.         Behavior: Behavior normal.      Comments: Much calmer today. No rumination.          Physical Exam  MEASUREMENTS: BMI- 25.0.  HEENT: Fluid in the right ear. Increased fluid in the left ear.    Results        ASSESSMENT & PLAN     Problem List Items Addressed This Visit       Generalized anxiety disorder    Relevant Medications    citalopram (CeleXA) 20 mg tablet    Hyperlipidemia    Overview   Taking Rosuvastatin 10 mg daily.  Unable to tolerate Pravastatin due to side effects.    7/2016 CACS was 4.37 (all in LCx)  9/2023 CACS was 63.18 (LCx 57.62, LAD 3.77, RCA 1.79, LM 0)    9/2017 lipoprotein profile overall favorable with LDL particle size  6/2023 TC/HDL ratio 4.3 (LDL 98 & TRIG 214).         Relevant Medications    rosuvastatin (Crestor) 10 mg tablet    Hypertension    Overview   Suspect component of white coat hypertension, home numbers normotensive via third validated cuff.    Current regimen:  HCTZ 12.5 mg daily  Olmesartan 20 mg daily, started 7/2023    Prior medications:   Lisinopril discontinued 7/2023 due to cough  Propranolol discontinued in the past due to bradycardia.         Relevant Medications    hydroCHLOROthiazide (Microzide) 12.5 mg capsule    olmesartan (BENIcar) 20 mg tablet    Hypothyroidism    Overview   1/2024 TSH normal, to be checked annually unless symptomatic, continue present regimen.  CURRENT DOSE: Synthroid 75 mcg daily.         Relevant Medications    levothyroxine (Synthroid, Levoxyl) 75 mcg tablet       Assessment & Plan  Generalized Anxiety Disorder (SO)  SO symptoms improving with current treatment. Celexa effective but may need adjustment in winter. intermediate may impact anxiety.  - Continue Celexa (citalopram) 20 mg daily.  - Consider increasing Celexa to 30 mg during winter if symptoms worsen.  - Reassess dosage post-residential.  - Encourage continued therapy and social activities.    Kidney stone  6-8 mm stone in  left kidney. Urology evaluation scheduled.  - Attend urology appointment for evaluation and management.    Post-hernia surgery recovery  Recovering from hernia surgery. Discomfort after lifting, improving with physical therapy.  - Advise against heavy lifting.  - Continue physical therapy.    Pelvic floor dysfunction  Improvement with physical therapy.  - Continue pelvic floor physical therapy.    Fluid in ear  Fluid in right ear, likely eustachian tube dysfunction.  - Use Flonase (fluticasone) nasal spray, two sprays each nostril at night.  - Advise ear drainage techniques in hot, steamy shower.      Level 4    Follow-Up Recommendations: 3mo    Please excuse any typos or grammatical errors, part of this note was constructed with Dragon dictation software.    This medical note was created with the assistance of artificial intelligence (AI) for documentation purposes. The content has been reviewed and confirmed by the healthcare provider for accuracy and completeness. Patient consented to the use of audio recording and use of AI during their visit.         Carmen Godinez DO, [employer]  [facility]   Office: [phone]  6/4/2025 5:41 PM

## 2025-06-04 ENCOUNTER — APPOINTMENT (OUTPATIENT)
Dept: UROLOGY | Facility: CLINIC | Age: 60
End: 2025-06-04
Payer: COMMERCIAL

## 2025-06-04 ENCOUNTER — OFFICE VISIT (OUTPATIENT)
Dept: UROLOGY | Facility: CLINIC | Age: 60
End: 2025-06-04
Payer: COMMERCIAL

## 2025-06-04 DIAGNOSIS — N45.1 CHRONIC EPIDIDYMITIS: ICD-10-CM

## 2025-06-04 DIAGNOSIS — N50.82 PAINFUL SCROTUM: Primary | ICD-10-CM

## 2025-06-04 PROCEDURE — 1036F TOBACCO NON-USER: CPT | Performed by: UROLOGY

## 2025-06-04 PROCEDURE — 99213 OFFICE O/P EST LOW 20 MIN: CPT | Performed by: UROLOGY

## 2025-06-04 NOTE — PROGRESS NOTES
"PCP notes and [facility] notes x2 reviewed  FUV here for evaluation of epididymitis and left scrotal pain, referred by Dr Simon Milian    Last Visit: 5/6/25  Patient elects   UA/CX/GC/CT/PVR/ureaplasma/mycoplasma   Pelvic floor PT    Today's visit:    #Scrotal pain   - has been doing pelvic floor PT and very satisfied with results  - no f/c/n/v      How long has this been going on- a couple months -started after hernia repair in March   which side/ or both- left   where is pain located- left testicle   any noticeable swelling- not on testicle , but on epididymis per patient  what was tried to relieve pain-  Cipro, ice. Discomfort worsens with physical activity   History of UTI/ STD exposure- no  History of vasectomy- yes  Hernia repair, left sided in March, 2025  No f/c/n/v  No hematuria   Was on 6 weeks of cipro because was told he has prostatitis   Lab Results   Component Value Date    PSA 0.4 02/21/2025     No components found for: \"CBC\"  No results found for: \"HGBA1C\"    No results found for the last 90 days.    Scrotal US, 3/2/25,   IMPRESSION:   1. Normal sonographic appearance of the testicles with preserved blood   flow.   2. Small left varicocele.     Scrotal US, 2/3/25,  IMPRESSION:   1. Small right-sided scrotal hydrocele.   2.  Small right epididymal cyst.   3.  Otherwise, normal study.   PMH:  Medical History[1]     PSH:  Surgical History[2]     Medications:  Current Medications[3]    Allergy:  Allergies[4]     Exam  Testicles descended bilaterally, nontender, no masses  Vasa palpable bilaterally  Varicocele: No (no clinical hydrocele or varicocele)   Penis circ'd, no lesions, no plaques  Left sided hernia incision healing well       Assessment/Plan  #scrotal pain , improving  Continue Pelvic floor PT  Will talk to his PCP for evaluation of hip issues/pain   Warning signs reviewed    Scribe Attestation  By signing my name below, Kourtney MOTT , Scribe   attest that this documentation has been " prepared under the direction and in the presence of Laurel Buchanan MD.             [1]   Past Medical History:  Diagnosis Date    Encounter for screening for malignant neoplasm of prostate     Screening PSA (prostate specific antigen)    Localized swelling, mass and lump, unspecified 10/05/2017    Nodule, subcutaneous   [2]   Past Surgical History:  Procedure Laterality Date    HERNIA REPAIR Left 03/14/2025    Inguinal    TONSILLECTOMY  02/11/2014    Tonsillectomy With Adenoidectomy   [3]   Current Outpatient Medications:     citalopram (CeleXA) 20 mg tablet, Take 1 tablet (20 mg) by mouth once daily., Disp: 90 tablet, Rfl: 3    hydroCHLOROthiazide (Microzide) 12.5 mg capsule, Take 1 capsule (12.5 mg) by mouth once daily., Disp: 90 capsule, Rfl: 3    levothyroxine (Synthroid, Levoxyl) 75 mcg tablet, Take 1 tablet (75 mcg) by mouth once daily., Disp: 90 tablet, Rfl: 3    olmesartan (BENIcar) 20 mg tablet, Take 0.5 tablets (10 mg) by mouth once daily., Disp: 45 tablet, Rfl: 3    rosuvastatin (Crestor) 10 mg tablet, Take 1 tablet (10 mg) by mouth once daily., Disp: 90 tablet, Rfl: 3  [4]   Allergies  Allergen Reactions    Pravastatin Other    Sulfamethoxazole-Trimethoprim Hives

## 2025-08-04 ENCOUNTER — APPOINTMENT (OUTPATIENT)
Dept: PRIMARY CARE | Facility: CLINIC | Age: 60
End: 2025-08-04
Payer: COMMERCIAL